# Patient Record
Sex: FEMALE | Race: WHITE | NOT HISPANIC OR LATINO | Employment: FULL TIME | URBAN - NONMETROPOLITAN AREA
[De-identification: names, ages, dates, MRNs, and addresses within clinical notes are randomized per-mention and may not be internally consistent; named-entity substitution may affect disease eponyms.]

---

## 2023-11-04 ENCOUNTER — APPOINTMENT (OUTPATIENT)
Dept: PRIMARY CARE | Facility: CLINIC | Age: 58
End: 2023-11-04

## 2024-03-27 PROBLEM — Z98.890 H/O CERVICAL BIOPSY: Chronic | Status: ACTIVE | Noted: 2024-03-27

## 2024-03-27 PROBLEM — I10 HIGH BLOOD PRESSURE: Status: ACTIVE | Noted: 2024-03-27

## 2024-03-27 PROBLEM — C43.72 MALIGNANT MELANOMA OF LEFT LOWER LEG (MULTI): Status: ACTIVE | Noted: 2024-03-27

## 2024-03-28 NOTE — PROGRESS NOTES
Subjective      HPI:          Bethany Johnston is a 59 y.o. female 59 y.o. is here today for PE/health maintenance      Chief Complaint   Patient presents with    Establish Care     No concerns - wants to establish with local PCP since moving from [place]    Immunizations     Received flu vaccine earlier       Pt is NEW PATIENT to the office today     Name of specialists that pt is currently seeing:?            Last colonoscopy-?  2019    Last Tdap-?  unknown    Last DXA-?  Hasn't had one          Has had Shingrix         History HTN- was on enalapril and HCTZ  2023         Pt has 3 kids and 1 1 1/2 yr old grand child       Pt has history Malignant melanoma       History Cervical bx-  by When- CNP- last year - saw 2 weeks ago- Mammo planned May 2nd       [facility]PTFS recommend  laboratory  screening for HIV in patients ages 15-65-   Discussed       Health Maintenance Topics        Topic Date     Yearly Adult Physical today    HIV Screening Never done    Colorectal Cancer Screening 2019     Hepatitis C Screening 4/2023 non-reactive     DTaP/Tdap/Td Vaccines Discussed      Health Maintenance Topics        Topic Date     Mammogram 4/2023     Health Maintenance Topics with due status: Not Due       Topic Last Completion Date    Cervical Cancer Screening- When, CNP  01/18/2022- history cervical bx      Health Maintenance Topics with due status: Completed       Topic Last Completion Date    Zoster Vaccines 01/17/2022; 10/30/2021     Health Maintenance Topics with due status: Aged Out       Topic Date Due    HIB Vaccines Aged Out    IPV Vaccines Aged Out    Hepatitis A Vaccines Aged Out    Meningococcal Vaccine Aged Out    Rotavirus Vaccines Aged Out    HPV Vaccines Aged Out    Pneumococcal Vaccine: Pediatrics (0 to 5 Years) and At-Risk Patients (6 to 64 Years) Aged Out                 Immunization History   Administered Date(s) Administered    Flu vaccine, quadrivalent, no egg protein, age 6 month or greater (FLUCELVAX) 10/14/2019,  10/24/2020, 10/30/2021    Moderna COVID-19 vaccine, Fall 2023, 12 yeasrs and older (50mcg/0.5mL) 10/10/2023    Moderna SARS-CoV-2 Vaccination 04/27/2021, 05/25/2021, 01/23/2022    Zoster vaccine, recombinant, adult (SHINGRIX) 10/30/2021, 01/17/2022         Social History     Tobacco Use   Smoking Status Never   Smokeless Tobacco Never         If candidate, consider LDLCT-         reports that she does not currently use alcohol.       No visits with results within 12 Month(s) from this visit.   Latest known visit with results is:   Legacy Encounter on 01/17/2022   Component Date Value Ref Range Status    Glucose 01/17/2022 83  74 - 99 mg/dL Final    Sodium 01/17/2022 143  136 - 145 mmol/L Final    Potassium 01/17/2022 4.1  3.5 - 5.3 mmol/L Final    Chloride 01/17/2022 103  98 - 107 mmol/L Final    Bicarbonate 01/17/2022 32  21 - 32 mmol/L Final    Anion Gap 01/17/2022 12  10 - 20 mmol/L Final    Urea Nitrogen 01/17/2022 23  6 - 23 mg/dL Final    Creatinine 01/17/2022 0.62  0.50 - 1.05 mg/dL Final    GFR Female 01/17/2022 >90  >90 mL/min/1.73m2 Final    Comment:  CALCULATIONS OF ESTIMATED GFR ARE PERFORMED   USING THE 2021 CKD-EPI STUDY REFIT EQUATION   WITHOUT THE RACE VARIABLE FOR THE IDMS-TRACEABLE   CREATININE METHODS.    https://jasn.asnjournals.org/content/early/2021/09/22/ASN.6811875832      Calcium 01/17/2022 10.1  8.6 - 10.6 mg/dL Final             Current Outpatient Medications:     metFORMIN (Glucophage) 500 mg tablet, Take 1 tablet (500 mg) by mouth once daily., Disp: , Rfl:     multivitamin tablet, Take 1 tablet by mouth once daily., Disp: , Rfl:     naltrexone (Depade) 50 mg tablet, Take 14 mg by mouth 2 times a day., Disp: , Rfl:     SUMAtriptan (Imitrex) 25 mg tablet, Take 1 tablet (25 mg) by mouth., Disp: , Rfl:       Review of Systems      Review of Systems        Objective        PE:          Visit Vitals  BP (!) 149/93 (BP Location: Left arm, Patient Position: Sitting, BP Cuff Size: Adult)   Pulse  "105   Temp 36.8 °C (98.2 °F) (Temporal)   Ht 1.575 m (5' 2\")   Wt 79.2 kg (174 lb 8 oz)   SpO2 96%   BMI 31.92 kg/m²   OB Status Postmenopausal   Smoking Status Never   BSA 1.86 m²                    Pt is A and O x3, NAD  Head- normocephalic and atraumatic,   EYES- conjunctiva- normal   lids- normal  EARS/NOSE- TM's normal, nasopharynx- normal and atraumatic  OROPHARYNX- normal  NECK- supple, FROM  THYROID- NT, normal size, no nodule noted  LYMPH- no cervical lymph nodes palpated   CV- RRR without murmur  PULM- CTA bilaterally, normal respiratory effort  RESPIRATORY EFFORT- normal , no retractions or nasal flaring   ABD- normoactive BS's , soft , NT, no hepatosplenomegaly palpated  EXT- no edema,NT  SKIN- no abnormal skin lesions noted  NEURO- no focal deficits  PSYCH- pleasant, normal judgement and insight    BP Readings from Last 3 Encounters:   03/29/24 (!) 149/93   08/23/22 135/79         Wt Readings from Last 3 Encounters:   03/29/24 79.2 kg (174 lb 8 oz)   08/23/22 84.2 kg (185 lb 10 oz)         BMI Readings from Last 3 Encounters:   03/29/24 31.92 kg/m²   08/23/22 33.81 kg/m²       The number and complexity of problems addressed is considered moderate.  The amount and/or complexity of data reviewed and analyzed is considered moderate. The risk of complications and/or morbidity/mortality of patient is considered moderate. Overall, this patient encounter is considered a moderate risk visit.        Assessment/Plan      Problem List Items Addressed This Visit          Active Problems    Screening for cholesterol level (Chronic)    Screening for diabetes mellitus (Chronic)    Screening for HIV (human immunodeficiency virus) (Chronic)    Well adult exam - Primary (Chronic)       No orders of the defined types were placed in this encounter.      Ordered lab          Follow up 4-6 weeks - recheck BP - if remains >140/90 restart the enaparil            Recommendations for women annual wellness exam:   Make sure " screenings for cervical and breast cancer are up to date if applicable- pap smears age 21-65  mammogram starting at age 35- 40 or sooner if positive family history of breast cancer   colonoscopy at age 45, earlier if positive family history for breast or colon cancer   Screen for osteoporosis with DXA bone scan starting at age 65 or sooner if risk factors present (long term steroid use, smoking, heavy alcohol use, history of fracture, rheumatoid arthritis, low body weight, family history of hip fracture)  Screening for lung cancer with low-dose CT in all adults age 50-80 who have a 20 pack-year smoking history and currently smoke or have quit within the past 15 years; and are symptom free/no prior pulmonary diagnosis  Gardisil vaccine age 9-26 years of age   Follow a healthy diet (Examples, Dash diet, Mediterranean diet)  Exercise 150 minutes per week   Maintain healthy weight (BMI < 25)  Do not smoke   Alcohol in moderation (up to 1 drink/day)  Get enough sleep (7-8 hours/night)  Take a prenatal vitamin with folic acid if possibility of pregnancy   Make sure immunizations are up to date   Recommend minimum 1,000 mg calcium and 600-800 IU vitamin D daily (combination of diet + supplement)- unless there is a contraindication   Visit dentist twice yearly      If you are less than 60 years old, have diabetes mellitus, or chronic kidney disease, your goal blood pressure is < 140/90.  If you are older than 60 years old and do not have diabetes or kidney disease, your goal blood pressure is < 150/90.

## 2024-03-29 ENCOUNTER — OFFICE VISIT (OUTPATIENT)
Dept: PRIMARY CARE | Facility: CLINIC | Age: 59
End: 2024-03-29
Payer: COMMERCIAL

## 2024-03-29 ENCOUNTER — LAB (OUTPATIENT)
Dept: LAB | Facility: LAB | Age: 59
End: 2024-03-29
Payer: COMMERCIAL

## 2024-03-29 ENCOUNTER — TELEPHONE (OUTPATIENT)
Dept: PRIMARY CARE | Facility: CLINIC | Age: 59
End: 2024-03-29

## 2024-03-29 VITALS
WEIGHT: 174.5 LBS | HEART RATE: 105 BPM | TEMPERATURE: 98.2 F | HEIGHT: 62 IN | SYSTOLIC BLOOD PRESSURE: 149 MMHG | BODY MASS INDEX: 32.11 KG/M2 | OXYGEN SATURATION: 96 % | DIASTOLIC BLOOD PRESSURE: 93 MMHG

## 2024-03-29 DIAGNOSIS — I10 PRIMARY HYPERTENSION: Chronic | ICD-10-CM

## 2024-03-29 DIAGNOSIS — Z13.220 SCREENING FOR CHOLESTEROL LEVEL: Chronic | ICD-10-CM

## 2024-03-29 DIAGNOSIS — G43.809 OTHER MIGRAINE WITHOUT STATUS MIGRAINOSUS, NOT INTRACTABLE: Chronic | ICD-10-CM

## 2024-03-29 DIAGNOSIS — Z13.1 SCREENING FOR DIABETES MELLITUS: Chronic | ICD-10-CM

## 2024-03-29 DIAGNOSIS — Z91.030 BEE STING ALLERGY: Primary | ICD-10-CM

## 2024-03-29 DIAGNOSIS — Z00.00 WELL ADULT EXAM: Primary | Chronic | ICD-10-CM

## 2024-03-29 DIAGNOSIS — Z11.4 SCREENING FOR HIV (HUMAN IMMUNODEFICIENCY VIRUS): Chronic | ICD-10-CM

## 2024-03-29 DIAGNOSIS — M85.80 OSTEOPENIA, UNSPECIFIED LOCATION: ICD-10-CM

## 2024-03-29 DIAGNOSIS — Z23 IMMUNIZATION DUE: Chronic | ICD-10-CM

## 2024-03-29 DIAGNOSIS — Z78.0 POSTMENOPAUSAL: Chronic | ICD-10-CM

## 2024-03-29 PROBLEM — G43.909 MIGRAINE WITHOUT STATUS MIGRAINOSUS, NOT INTRACTABLE: Chronic | Status: ACTIVE | Noted: 2024-03-29

## 2024-03-29 PROBLEM — N84.1 ENDOCERVICAL POLYP: Status: ACTIVE | Noted: 2023-03-23

## 2024-03-29 PROBLEM — Z12.11 SCREEN FOR COLON CANCER: Chronic | Status: ACTIVE | Noted: 2024-03-29

## 2024-03-29 PROBLEM — Z11.59 ENCOUNTER FOR HEPATITIS C SCREENING TEST FOR LOW RISK PATIENT: Chronic | Status: ACTIVE | Noted: 2024-03-29

## 2024-03-29 LAB
ANION GAP SERPL CALC-SCNC: 11 MMOL/L (ref 10–20)
BUN SERPL-MCNC: 17 MG/DL (ref 6–23)
CALCIUM SERPL-MCNC: 9.8 MG/DL (ref 8.6–10.6)
CHLORIDE SERPL-SCNC: 105 MMOL/L (ref 98–107)
CHOLEST SERPL-MCNC: 171 MG/DL (ref 0–199)
CHOLESTEROL/HDL RATIO: 3
CO2 SERPL-SCNC: 29 MMOL/L (ref 21–32)
CREAT SERPL-MCNC: 0.64 MG/DL (ref 0.5–1.05)
EGFRCR SERPLBLD CKD-EPI 2021: >90 ML/MIN/1.73M*2
GLUCOSE SERPL-MCNC: 95 MG/DL (ref 74–99)
GLUCOSE SERPL-MCNC: 95 MG/DL (ref 74–99)
HDLC SERPL-MCNC: 56.1 MG/DL
HIV 1+2 AB+HIV1 P24 AG SERPL QL IA: NONREACTIVE
LDLC SERPL CALC-MCNC: 99 MG/DL
NON HDL CHOLESTEROL: 115 MG/DL (ref 0–149)
POTASSIUM SERPL-SCNC: 3.9 MMOL/L (ref 3.5–5.3)
SODIUM SERPL-SCNC: 141 MMOL/L (ref 136–145)
TRIGL SERPL-MCNC: 78 MG/DL (ref 0–149)
TSH SERPL-ACNC: 1.59 MIU/L (ref 0.44–3.98)
VLDL: 16 MG/DL (ref 0–40)

## 2024-03-29 PROCEDURE — 82306 VITAMIN D 25 HYDROXY: CPT

## 2024-03-29 PROCEDURE — 90715 TDAP VACCINE 7 YRS/> IM: CPT | Performed by: FAMILY MEDICINE

## 2024-03-29 PROCEDURE — 36415 COLL VENOUS BLD VENIPUNCTURE: CPT

## 2024-03-29 PROCEDURE — 82947 ASSAY GLUCOSE BLOOD QUANT: CPT

## 2024-03-29 PROCEDURE — 3077F SYST BP >= 140 MM HG: CPT | Performed by: FAMILY MEDICINE

## 2024-03-29 PROCEDURE — 80061 LIPID PANEL: CPT

## 2024-03-29 PROCEDURE — 87389 HIV-1 AG W/HIV-1&-2 AB AG IA: CPT

## 2024-03-29 PROCEDURE — 90471 IMMUNIZATION ADMIN: CPT | Performed by: FAMILY MEDICINE

## 2024-03-29 PROCEDURE — 1036F TOBACCO NON-USER: CPT | Performed by: FAMILY MEDICINE

## 2024-03-29 PROCEDURE — 3080F DIAST BP >= 90 MM HG: CPT | Performed by: FAMILY MEDICINE

## 2024-03-29 PROCEDURE — 84443 ASSAY THYROID STIM HORMONE: CPT

## 2024-03-29 PROCEDURE — 80048 BASIC METABOLIC PNL TOTAL CA: CPT

## 2024-03-29 PROCEDURE — 99386 PREV VISIT NEW AGE 40-64: CPT | Performed by: FAMILY MEDICINE

## 2024-03-29 RX ORDER — MULTIVITAMIN
1 TABLET ORAL DAILY
COMMUNITY

## 2024-03-29 RX ORDER — SUMATRIPTAN SUCCINATE 25 MG/1
25 TABLET ORAL ONCE AS NEEDED
Qty: 9 TABLET | Refills: 0 | Status: SHIPPED | OUTPATIENT
Start: 2024-03-29

## 2024-03-29 RX ORDER — NALTREXONE HYDROCHLORIDE 50 MG/1
14 TABLET, FILM COATED ORAL 2 TIMES DAILY
COMMUNITY
Start: 2024-03-18

## 2024-03-29 RX ORDER — METFORMIN HYDROCHLORIDE 500 MG/1
500 TABLET ORAL
COMMUNITY
Start: 2024-03-18

## 2024-03-29 RX ORDER — SUMATRIPTAN SUCCINATE 25 MG/1
25 TABLET ORAL
COMMUNITY
End: 2024-03-29 | Stop reason: SDUPTHER

## 2024-03-29 RX ORDER — EPINEPHRINE 0.3 MG/.3ML
1 INJECTION SUBCUTANEOUS AS NEEDED
Qty: 1 EACH | Refills: 1 | Status: SHIPPED | OUTPATIENT
Start: 2024-03-29 | End: 2025-03-29

## 2024-03-29 NOTE — TELEPHONE ENCOUNTER
Patient wanted to make sure that you sent a Epipen sent to the [facility] pharmacy  in [place].

## 2024-04-04 ENCOUNTER — HOSPITAL ENCOUNTER (OUTPATIENT)
Dept: RADIOLOGY | Facility: CLINIC | Age: 59
Discharge: HOME | End: 2024-04-04
Payer: COMMERCIAL

## 2024-04-04 DIAGNOSIS — M85.80 OSTEOPENIA, UNSPECIFIED LOCATION: ICD-10-CM

## 2024-04-04 DIAGNOSIS — Z78.0 POSTMENOPAUSAL: Chronic | ICD-10-CM

## 2024-04-04 DIAGNOSIS — E55.9 VITAMIN D DEFICIENCY: Primary | ICD-10-CM

## 2024-04-04 LAB — 25(OH)D3 SERPL-MCNC: 28 NG/ML (ref 30–100)

## 2024-04-04 PROCEDURE — 77080 DXA BONE DENSITY AXIAL: CPT

## 2024-04-04 PROCEDURE — 77080 DXA BONE DENSITY AXIAL: CPT | Performed by: STUDENT IN AN ORGANIZED HEALTH CARE EDUCATION/TRAINING PROGRAM

## 2024-04-05 ENCOUNTER — TELEPHONE (OUTPATIENT)
Dept: PRIMARY CARE | Facility: CLINIC | Age: 59
End: 2024-04-05

## 2024-04-05 NOTE — TELEPHONE ENCOUNTER
Pt said she was supposed to call with bp results   04/01/24    140/79  04/02/24    147/94  04/03/24    160/89  04/04/24    142/88  04/05/24    140/87  04/06/24    138/86  Said she use to be on bp medication and if she needs to go back on bp medication?

## 2024-04-06 DIAGNOSIS — I10 PRIMARY HYPERTENSION: Primary | ICD-10-CM

## 2024-04-06 RX ORDER — ENALAPRIL MALEATE 5 MG/1
5 TABLET ORAL DAILY
Qty: 30 TABLET | Refills: 3 | Status: SHIPPED | OUTPATIENT
Start: 2024-04-06 | End: 2024-04-29 | Stop reason: DRUGHIGH

## 2024-04-22 ASSESSMENT — ENCOUNTER SYMPTOMS
SHORTNESS OF BREATH: 0
HEADACHES: 1
NECK PAIN: 1
PND: 0
PALPITATIONS: 1
HYPERTENSION: 1
BLURRED VISION: 0
SWEATS: 0
ORTHOPNEA: 0

## 2024-04-26 NOTE — PROGRESS NOTES
Subjective        Bethany Johnston. Is 59 y.o.. female. Here for follow up office visit-       Chief Complaint   Patient presents with    Hypertension     Does have bp machine with her to show the readings had been high       HPI:        Follow up for - recheck BP, elevated BMI    Pt was NP at last office visit and BP was elevated- 149/93    Pt had been on enalapril 10 mg dailly and HCTZ  25 mg daily in past       Pt is doing well    Labs were done 3/29/24       Doing well with weight- down 15 pounds from 2022       Home BP- on machine 162/74    140/87    160/89       Pt is avoiding caffeine and salt      Pt walking 9000 steps per day, hiking and biking             Lab on 03/29/2024   Component Date Value Ref Range Status    Glucose 03/29/2024 95  74 - 99 mg/dL Final    Cholesterol 03/29/2024 171  0 - 199 mg/dL Final          Age      Desirable   Borderline High   High     0-19 Y     0 - 169       170 - 199     >/= 200    20-24 Y     0 - 189       190 - 224     >/= 225         >24 Y     0 - 199       200 - 239     >/= 240   **All ranges are based on fasting samples. Specific   therapeutic targets will vary based on patient-specific   cardiac risk.    Pediatric guidelines reference:Pediatrics 2011, 128(S5).Adult guidelines reference: NCEP ATPIII Guidelines,JOHN 2001, 258:2486-97    Venipuncture immediately after or during the administration of Metamizole may lead to falsely low results. Testing should be performed immediately prior to Metamizole dosing.    HDL-Cholesterol 03/29/2024 56.1  mg/dL Final      Age       Very Low   Low     Normal    High    0-19 Y    < 35      < 40     40-45     ----  20-24 Y    ----     < 40      >45      ----        >24 Y      ----     < 40     40-60      >60      Cholesterol/HDL Ratio 03/29/2024 3.0   Final      Ref Values  Desirable  < 3.4  High Risk  > 5.0    LDL Calculated 03/29/2024 99  <=99 mg/dL Final                                Near   Borderline      AGE      Desirable  Optimal     High     High     Very High     0-19 Y     0 - 109     ---    110-129   >/= 130     ----    20-24 Y     0 - 119     ---    120-159   >/= 160     ----      >24 Y     0 -  99   100-129  130-159   160-189     >/=190      VLDL 03/29/2024 16  0 - 40 mg/dL Final    Triglycerides 03/29/2024 78  0 - 149 mg/dL Final       Age         Desirable   Borderline High   High     Very High   0 D-90 D    19 - 174         ----         ----        ----  91 D- 9 Y     0 -  74        75 -  99     >/= 100      ----    10-19 Y     0 -  89        90 - 129     >/= 130      ----    20-24 Y     0 - 114       115 - 149     >/= 150      ----         >24 Y     0 - 149       150 - 199    200- 499    >/= 500    Venipuncture immediately after or during the administration of Metamizole may lead to falsely low results. Testing should be performed immediately prior to Metamizole dosing.    Non HDL Cholesterol 03/29/2024 115  0 - 149 mg/dL Final          Age       Desirable   Borderline High   High     Very High     0-19 Y     0 - 119       120 - 144     >/= 145    >/= 160    20-24 Y     0 - 149       150 - 189     >/= 190      ----         >24 Y    30 mg/dL above LDL Cholesterol goal      HIV 1/2 Antigen/Antibody Screen wi* 03/29/2024 Nonreactive  Nonreactive Final    Glucose 03/29/2024 95  74 - 99 mg/dL Final    Sodium 03/29/2024 141  136 - 145 mmol/L Final    Potassium 03/29/2024 3.9  3.5 - 5.3 mmol/L Final    Chloride 03/29/2024 105  98 - 107 mmol/L Final    Bicarbonate 03/29/2024 29  21 - 32 mmol/L Final    Anion Gap 03/29/2024 11  10 - 20 mmol/L Final    Urea Nitrogen 03/29/2024 17  6 - 23 mg/dL Final    Creatinine 03/29/2024 0.64  0.50 - 1.05 mg/dL Final    eGFR 03/29/2024 >90  >60 mL/min/1.73m*2 Final    Calculations of estimated GFR are performed using the 2021 CKD-EPI Study Refit equation without the race variable for the IDMS-Traceable creatinine methods.  https://jasn.asnjournals.org/content/early/2021/09/22/ASN.8327252432    Calcium  03/29/2024 9.8  8.6 - 10.6 mg/dL Final    Thyroid Stimulating Hormone 03/29/2024 1.59  0.44 - 3.98 mIU/L Final    Vitamin D, 25-Hydroxy, Total 03/29/2024 28 (L)  30 - 100 ng/mL Final         REVIEW OF SYSTEMS:        Objective      Vitals:    04/29/24 0841   BP: 150/86   BP Location: Left arm   Patient Position: Sitting   BP Cuff Size: Adult   Pulse: 87   Resp: 14   Temp: 36.7 °C (98.1 °F)   TempSrc: Temporal   SpO2: 96%   Weight: 77.4 kg (170 lb 9.6 oz)                       PHYSICAL:      Patient is alert and oriented x 3 , NAD  HEAD- normocephalic and atraumatic   EYES-conjunctiva-normal, lids - normal  EARS/NOSE- normal external exam   NECK-supple,FROM  CV- RRR without murmur  PULM- CTA bilaterally, normal respiratory effort  RESPIRATORY EFFORT- normal , no retractions or nasal flaring   ABD- normoactive BS's   EXT- no edema,NT  SKIN- no abnormal skin lesions noted  NEURO- no focal deficits  PSYCH- pleasant, normal judgement and insight      BP Readings from Last 3 Encounters:   04/29/24 150/86   03/29/24 (!) 149/93   08/23/22 135/79             Wt Readings from Last 3 Encounters:   04/29/24 77.4 kg (170 lb 9.6 oz)   03/29/24 79.2 kg (174 lb 8 oz)   08/23/22 84.2 kg (185 lb 10 oz)           BMI Readings from Last 3 Encounters:   04/29/24 31.20 kg/m²   03/29/24 31.92 kg/m²   08/23/22 33.81 kg/m²               The number and complexity of problems addressed is considered moderate.  The amount and/or complexity of data reviewed and analyzed is considered moderate. The risk of complications and/or morbidity/mortality of patient is considered moderate. Overall, this patient encounter is considered a moderate risk visit.      Patient's BMI is elevated.  Plan- diet and exercise- BMI is elevated. Need to increase activity on a daily basis especially walking.  Monitor  total calories per day- decrease carbohydrates and fats. Goal - lose 1-2 pounds per week.    Recommend 150 minutes of moderate-intensity exercise as  tolerated per week and 2-3 days of resistance, flexibility, and neuromotor exercises per week.    Normal BMI- 18.5-25    Overweight=  BMI 26-29    Obese= BMI 30-39    Morbidly Obese = BMI >40      Common Side Effects- ACEI    Cough  Dizziness  Feeling tired  Headache  Problems sleeping  Fast heart beat    Call your doctor if you have any of these signs:  Chest pain  Problems breathing or swallowing  Swelling in the face, eyes, lips, tongue, or legs        Assessment/Plan      Problem List Items Addressed This Visit          Active Problems    Class 1 obesity due to excess calories with body mass index (BMI) of 31.0 to 31.9 in adult (Chronic)    High blood pressure - Primary    Well adult exam (Chronic)       No orders of the defined types were placed in this encounter.            Increase Enalapril to 10 mg daily             Follow up in 4-6 weeks - recheck BP

## 2024-04-29 ENCOUNTER — OFFICE VISIT (OUTPATIENT)
Dept: PRIMARY CARE | Facility: CLINIC | Age: 59
End: 2024-04-29
Payer: COMMERCIAL

## 2024-04-29 VITALS
RESPIRATION RATE: 14 BRPM | TEMPERATURE: 98.1 F | DIASTOLIC BLOOD PRESSURE: 86 MMHG | BODY MASS INDEX: 31.2 KG/M2 | HEART RATE: 87 BPM | SYSTOLIC BLOOD PRESSURE: 150 MMHG | WEIGHT: 170.6 LBS | OXYGEN SATURATION: 96 %

## 2024-04-29 DIAGNOSIS — E66.09 CLASS 1 OBESITY DUE TO EXCESS CALORIES WITH BODY MASS INDEX (BMI) OF 31.0 TO 31.9 IN ADULT, UNSPECIFIED WHETHER SERIOUS COMORBIDITY PRESENT: Chronic | ICD-10-CM

## 2024-04-29 DIAGNOSIS — Z00.00 WELL ADULT EXAM: Chronic | ICD-10-CM

## 2024-04-29 DIAGNOSIS — I10 PRIMARY HYPERTENSION: Primary | Chronic | ICD-10-CM

## 2024-04-29 PROBLEM — E66.811 CLASS 1 OBESITY DUE TO EXCESS CALORIES WITH BODY MASS INDEX (BMI) OF 31.0 TO 31.9 IN ADULT: Chronic | Status: ACTIVE | Noted: 2024-04-29

## 2024-04-29 PROCEDURE — 3008F BODY MASS INDEX DOCD: CPT | Performed by: FAMILY MEDICINE

## 2024-04-29 PROCEDURE — 3077F SYST BP >= 140 MM HG: CPT | Performed by: FAMILY MEDICINE

## 2024-04-29 PROCEDURE — 3079F DIAST BP 80-89 MM HG: CPT | Performed by: FAMILY MEDICINE

## 2024-04-29 PROCEDURE — 1036F TOBACCO NON-USER: CPT | Performed by: FAMILY MEDICINE

## 2024-04-29 PROCEDURE — 99214 OFFICE O/P EST MOD 30 MIN: CPT | Performed by: FAMILY MEDICINE

## 2024-04-29 RX ORDER — ENALAPRIL MALEATE 10 MG/1
10 TABLET ORAL DAILY
Qty: 30 TABLET | Refills: 3 | Status: SHIPPED | OUTPATIENT
Start: 2024-04-29 | End: 2025-04-29

## 2024-05-07 ENCOUNTER — TELEPHONE (OUTPATIENT)
Dept: PRIMARY CARE | Facility: CLINIC | Age: 59
End: 2024-05-07
Payer: COMMERCIAL

## 2024-05-08 ENCOUNTER — TELEPHONE (OUTPATIENT)
Dept: PRIMARY CARE | Facility: CLINIC | Age: 59
End: 2024-05-08
Payer: COMMERCIAL

## 2024-05-08 DIAGNOSIS — Z01.818 PRE-OP EVALUATION: Primary | Chronic | ICD-10-CM

## 2024-05-08 NOTE — TELEPHONE ENCOUNTER
Dr. Ch, you can order these.    Lizette cannot schedule without an order.    Please see my email with instructions for ordering.

## 2024-05-08 NOTE — TELEPHONE ENCOUNTER
----- Message from Lizette Ann sent at 5/8/2024  9:05 AM EDT -----  Regarding: PFT order  Pt called to schedule PFT. Order needed to schedule.  Pre-Op Clearance

## 2024-05-08 NOTE — TELEPHONE ENCOUNTER
Patient called back, scheduled apt for June 28th for clearance.   Transferred to [facility] to schedule PFT   Pt Expressed understanding of all information provided

## 2024-05-09 ENCOUNTER — TELEPHONE (OUTPATIENT)
Dept: PRIMARY CARE | Facility: CLINIC | Age: 59
End: 2024-05-09
Payer: COMMERCIAL

## 2024-05-09 DIAGNOSIS — Z01.818 PRE-OP EVALUATION: Primary | Chronic | ICD-10-CM

## 2024-05-09 NOTE — TELEPHONE ENCOUNTER
----- Message from Lizette Ann sent at 5/8/2024  4:10 PM EDT -----  Regarding: Stress Test order  Pt states she also needs an order for a stress test for pre-op clearance.

## 2024-05-09 NOTE — TELEPHONE ENCOUNTER
Called and spoke with patient. Patient was informed, understanding verbalized.  Transferred to [facility] to schedule

## 2024-06-02 ASSESSMENT — ENCOUNTER SYMPTOMS
SWEATS: 0
PALPITATIONS: 1
BLURRED VISION: 0
PND: 0
HEADACHES: 1
ORTHOPNEA: 0
NECK PAIN: 1
SHORTNESS OF BREATH: 0
HYPERTENSION: 1

## 2024-06-03 NOTE — PROGRESS NOTES
Subjective        Bethany Johnston. Is 59 y.o.. female. Here for follow up office visit-       Chief Complaint   Patient presents with    Follow-up    Blood Pressure Check       HPI:        Follow up for - recheck BP    At last office visit-     Home BP- on machine 162/74     140/87     160/89       Today BP is 120/74    And home BP's are 130/70's         Pt is avoiding caffeine and salt      Pt is doing great with her weight loss - down 20 pounds over 2 years     Bikes and walks         Pt walking 9000 steps per day, hiking and biking       Enalapril was increased to 10 mg daily       Pt also has Pre-order placed form [facility] procedure- with IV sedation-   Required PFT's-Scheduled 07/03/2024  Cardiac stress testing  -ordered  Scheduled for 06/25/2024    Needs Preop clearance visit placed /scheduled after these tests are done  and resulted     Surgery date - July 15th- sinuses , implants , bone grafts  Oral surgeon              Lab on 03/29/2024   Component Date Value Ref Range Status    Glucose 03/29/2024 95  74 - 99 mg/dL Final    Cholesterol 03/29/2024 171  0 - 199 mg/dL Final          Age      Desirable   Borderline High   High     0-19 Y     0 - 169       170 - 199     >/= 200    20-24 Y     0 - 189       190 - 224     >/= 225         >24 Y     0 - 199       200 - 239     >/= 240   **All ranges are based on fasting samples. Specific   therapeutic targets will vary based on patient-specific   cardiac risk.    Pediatric guidelines reference:Pediatrics 2011, 128(S5).Adult guidelines reference: NCEP ATPIII Guidelines,JOHN 2001, 258:2486-97    Venipuncture immediately after or during the administration of Metamizole may lead to falsely low results. Testing should be performed immediately prior to Metamizole dosing.    HDL-Cholesterol 03/29/2024 56.1  mg/dL Final      Age       Very Low   Low     Normal    High    0-19 Y    < 35      < 40     40-45     ----  20-24 Y    ----     < 40      >45      ----        >24 Y       ----     < 40     40-60      >60      Cholesterol/HDL Ratio 03/29/2024 3.0   Final      Ref Values  Desirable  < 3.4  High Risk  > 5.0    LDL Calculated 03/29/2024 99  <=99 mg/dL Final                                Near   Borderline      AGE      Desirable  Optimal    High     High     Very High     0-19 Y     0 - 109     ---    110-129   >/= 130     ----    20-24 Y     0 - 119     ---    120-159   >/= 160     ----      >24 Y     0 -  99   100-129  130-159   160-189     >/=190      VLDL 03/29/2024 16  0 - 40 mg/dL Final    Triglycerides 03/29/2024 78  0 - 149 mg/dL Final       Age         Desirable   Borderline High   High     Very High   0 D-90 D    19 - 174         ----         ----        ----  91 D- 9 Y     0 -  74        75 -  99     >/= 100      ----    10-19 Y     0 -  89        90 - 129     >/= 130      ----    20-24 Y     0 - 114       115 - 149     >/= 150      ----         >24 Y     0 - 149       150 - 199    200- 499    >/= 500    Venipuncture immediately after or during the administration of Metamizole may lead to falsely low results. Testing should be performed immediately prior to Metamizole dosing.    Non HDL Cholesterol 03/29/2024 115  0 - 149 mg/dL Final          Age       Desirable   Borderline High   High     Very High     0-19 Y     0 - 119       120 - 144     >/= 145    >/= 160    20-24 Y     0 - 149       150 - 189     >/= 190      ----         >24 Y    30 mg/dL above LDL Cholesterol goal      HIV 1/2 Antigen/Antibody Screen wi* 03/29/2024 Nonreactive  Nonreactive Final    Glucose 03/29/2024 95  74 - 99 mg/dL Final    Sodium 03/29/2024 141  136 - 145 mmol/L Final    Potassium 03/29/2024 3.9  3.5 - 5.3 mmol/L Final    Chloride 03/29/2024 105  98 - 107 mmol/L Final    Bicarbonate 03/29/2024 29  21 - 32 mmol/L Final    Anion Gap 03/29/2024 11  10 - 20 mmol/L Final    Urea Nitrogen 03/29/2024 17  6 - 23 mg/dL Final    Creatinine 03/29/2024 0.64  0.50 - 1.05 mg/dL Final    eGFR 03/29/2024 >90   >60 mL/min/1.73m*2 Final    Calculations of estimated GFR are performed using the 2021 CKD-EPI Study Refit equation without the race variable for the IDMS-Traceable creatinine methods.  https://jasn.asnjournals.org/content/early/2021/09/22/ASN.0501587610    Calcium 03/29/2024 9.8  8.6 - 10.6 mg/dL Final    Thyroid Stimulating Hormone 03/29/2024 1.59  0.44 - 3.98 mIU/L Final    Vitamin D, 25-Hydroxy, Total 03/29/2024 28 (L)  30 - 100 ng/mL Final     Social History     Tobacco Use   Smoking Status Never   Smokeless Tobacco Never         REVIEW OF SYSTEMS:        Objective      Vitals:    06/05/24 0806   BP: 120/74   BP Location: Left arm   Patient Position: Sitting   BP Cuff Size: Large adult   Pulse: 77   Resp: 14   Temp: 37.2 °C (99 °F)   SpO2: 99%   Weight: 74.5 kg (164 lb 3.2 oz)                       PHYSICAL:      Patient is alert and oriented x 3 , NAD  HEAD- normocephalic and atraumatic   EYES-conjunctiva-normal, lids - normal  EARS/NOSE- normal external exam   NECK-supple,FROM  CV- RRR without murmur  PULM- CTA bilaterally, normal respiratory effort  RESPIRATORY EFFORT- normal , no retractions or nasal flaring   ABD- normoactive BS's   EXT- no edema,NT  SKIN- no abnormal skin lesions noted  NEURO- no focal deficits  PSYCH- pleasant, normal judgement and insight      BP Readings from Last 3 Encounters:   06/05/24 120/74   04/29/24 150/86   03/29/24 (!) 149/93             Wt Readings from Last 3 Encounters:   06/05/24 74.5 kg (164 lb 3.2 oz)   04/29/24 77.4 kg (170 lb 9.6 oz)   03/29/24 79.2 kg (174 lb 8 oz)           BMI Readings from Last 3 Encounters:   06/05/24 30.03 kg/m²   04/29/24 31.20 kg/m²   03/29/24 31.92 kg/m²               The number and complexity of problems addressed is considered moderate.  The amount and/or complexity of data reviewed and analyzed is considered moderate. The risk of complications and/or morbidity/mortality of patient is considered moderate. Overall, this patient encounter is  considered a moderate risk visit.    Common Side Effects- ACEI    Cough  Dizziness  Feeling tired  Headache  Problems sleeping  Fast heart beat    Call your doctor if you have any of these signs:  Chest pain  Problems breathing or swallowing  Swelling in the face, eyes, lips, tongue, or legs        Assessment/Plan      Problem List Items Addressed This Visit          Active Problems    Class 1 obesity due to excess calories with body mass index (BMI) of 31.0 to 31.9 in adult (Chronic)    High blood pressure - Primary                   Continue medication            Follow up for Preop clearance after tests are done and resulted

## 2024-06-05 ENCOUNTER — OFFICE VISIT (OUTPATIENT)
Dept: PRIMARY CARE | Facility: CLINIC | Age: 59
End: 2024-06-05
Payer: COMMERCIAL

## 2024-06-05 VITALS
OXYGEN SATURATION: 99 % | HEART RATE: 77 BPM | BODY MASS INDEX: 30.03 KG/M2 | RESPIRATION RATE: 14 BRPM | TEMPERATURE: 99 F | DIASTOLIC BLOOD PRESSURE: 74 MMHG | SYSTOLIC BLOOD PRESSURE: 120 MMHG | WEIGHT: 164.2 LBS

## 2024-06-05 DIAGNOSIS — I10 PRIMARY HYPERTENSION: Primary | Chronic | ICD-10-CM

## 2024-06-05 DIAGNOSIS — E66.09 CLASS 1 OBESITY DUE TO EXCESS CALORIES WITH BODY MASS INDEX (BMI) OF 31.0 TO 31.9 IN ADULT, UNSPECIFIED WHETHER SERIOUS COMORBIDITY PRESENT: Chronic | ICD-10-CM

## 2024-06-05 PROCEDURE — 1036F TOBACCO NON-USER: CPT | Performed by: FAMILY MEDICINE

## 2024-06-05 PROCEDURE — 3078F DIAST BP <80 MM HG: CPT | Performed by: FAMILY MEDICINE

## 2024-06-05 PROCEDURE — 99214 OFFICE O/P EST MOD 30 MIN: CPT | Performed by: FAMILY MEDICINE

## 2024-06-05 PROCEDURE — 3008F BODY MASS INDEX DOCD: CPT | Performed by: FAMILY MEDICINE

## 2024-06-05 PROCEDURE — 3074F SYST BP LT 130 MM HG: CPT | Performed by: FAMILY MEDICINE

## 2024-06-05 RX ORDER — BUPROPION HYDROCHLORIDE 300 MG/1
300 TABLET ORAL DAILY
COMMUNITY

## 2024-06-11 ASSESSMENT — ENCOUNTER SYMPTOMS
HOARSE VOICE: 1
STRIDOR: 0
VOMITING: 0
TROUBLE SWALLOWING: 1
SORE THROAT: 1
DIARRHEA: 0
SWOLLEN GLANDS: 0
COUGH: 1
HEADACHES: 0
SHORTNESS OF BREATH: 0
ABDOMINAL PAIN: 0
NECK PAIN: 0

## 2024-06-11 NOTE — PROGRESS NOTES
Subjective      HPI:    Bethany Johnston. Is 59 y.o.. female. Here for acute visit-           Chief Complaint   Patient presents with    Sore Throat    Nasal Congestion     + chest congestion. Constant x approx 1 week  Pt states that she feels the same way she did when she had bronchitis, i8n the past  Pt denies fever, nausea, vomiting, diarrhea  Sudafed and Flonase mildly alleviate sxs, nothing exacerbates sxs  Pt has not been treated elsewhere for these sxs.               Social History     Tobacco Use   Smoking Status Never   Smokeless Tobacco Never       Social History     Substance and Sexual Activity   Alcohol Use Not Currently          Objective            Vitals:    06/12/24 1103   BP: 116/78   BP Location: Left arm   Patient Position: Sitting   BP Cuff Size: Adult   Pulse: 97   Resp: 12   Temp: (!) 38 °C (100.4 °F)   SpO2: 95%           PHYSICAL:      CONSTITUTIONAL- NAD, Pt is A and O x3, Vital signs are within normal limits, well- hydrated, non-toxic   General Appearance- normal , good hygiene, talks easily  EYES- conjunctiva and lids- normal  EARS/NOSE-TM's normal, head normocephalic and atraumatic, nasopharynx-no nasal discharge, no trismus, no hot potato voice, oropharynx- normal  NECK- supple, FROM  LYMPH- no cervical lymph nodes palpated   CV- RRR without murmur  PULM- mild rhonchi, no wheezes bilaterally       Respiratory effort- normal respiratory effort , no retractions or nasal flaring   ABDOMEN- normoactive BS's , soft , NT, no hepatosplenomegaly palpated  EXTREMITIES- no edema or varicosities           SKIN- no abnormal skin lesions on inspection or palpation   NEURO- no focal deficits  PSYCH- pleasant, normal judgement and insight            BP Readings from Last 3 Encounters:   06/12/24 116/78   06/05/24 120/74   04/29/24 150/86         Wt Readings from Last 3 Encounters:   06/05/24 74.5 kg (164 lb 3.2 oz)   04/29/24 77.4 kg (170 lb 9.6 oz)   03/29/24 79.2 kg (174 lb 8 oz)       BMI Readings  from Last 3 Encounters:   06/05/24 30.03 kg/m²   04/29/24 31.20 kg/m²   03/29/24 31.92 kg/m²           The number and complexity of problems addressed is considered moderate.  The amount and/or complexity of data reviewed and analyzed is considered moderate. The risk of complications and/or morbidity/mortality of patient is considered moderate. Overall, this patient encounter is considered a moderate risk visit.      What are antibiotics?  Antibiotics are medicines that help people fight infections caused by bacteria. They work by killing bacteria that are in the body. These medicines come in many different forms, including pills, ointments, and liquids that are given by injection.    Antibiotics can do a lot of good. For people with serious bacterial infections, antibiotics can save lives. But people use them far too often, even when they're not needed. This is causing a very serious problem called antibiotic resistance. Antibiotic resistance happens when bacteria that have been exposed to an antibiotic change so that the antibiotic can no longer kill them. In those bacteria, the antibiotic has no effect. Because of this problem, doctors are having a harder and harder time treating infections. Experts worry that there will soon be infections that don't respond to any antibiotics.    When are antibiotics helpful?  Antibiotics can help people fight off infections caused by bacteria. They do not work on infections caused by viruses.    Some common bacterial infections that are treated with antibiotics include:    Strep throat    Pneumonia (an infection of the lungs)    Bladder infections    Infections you catch through sex, such as gonorrhea and chlamydia    When are antibiotics NOT helpful?    Antibiotics do not work on infections caused by viruses.    Antibiotics are not helpful for the common cold, because the common cold is caused by a virus.    Antibiotics are not helpful for the flu, because the flu is caused by  a virus. (People with the flu can be treated with medicines called antiviral medicines, which are different from antibiotics.)      Even though antibiotics don't work on infections caused by viruses, people sometimes believe that they do. That's because they took antibiotics for a viral infection before and then got better. The problem is that those people would have gotten better with or without an antibiotic. When they get better with the antibiotic, they think that's what cured them, when in reality the antibiotic had nothing to do with it.    What's the harm in taking antibiotics even if they might not help?  There are many reasons you should not take antibiotics unless you absolutely need them:    Antibiotics cause side effects, such as nausea, vomiting, and diarrhea. They can even make it more likely that you will get a different kind of infection, such as yeast infection (if you are a woman).    Allergies to antibiotics are common. You can develop an allergy to an antibiotic, even if you have not had a problem with it before. Some allergies are just unpleasant, causing rashes and itching. But some can be very serious and even life-threatening. It is better to avoid any risk of an allergy, if the antibiotic wouldn't help you anyway.    Overuse of antibiotics leads to antibiotic resistance. Using antibiotics when they are not needed gives bacteria a chance to change, so that the antibiotics cannot hurt them later on. People who have infections caused by antibiotic-resistant bacteria often have to be treated in the hospital with many different antibiotics. People can even die from these infections, because there is no antibiotic that will cure them.    When should I take antibiotics?  You should take antibiotics only when a doctor or nurse prescribes them to you. You should never take antibiotics prescribed to someone else, and you should not take antibiotics that were prescribed to you for a previous illness.  "When prescribing an antibiotic, doctors and nurses have to carefully pick the right antibiotic for a particular infection. Not all antibiotics work on all bacteria.    If you do have an infection caused by a bacteria, your doctor or nurse might want to find out what that bacteria is, and which antibiotics can kill it. They do this by taking a \"culture\" of the bacteria and growing it in the lab. But it's not possible to do a culture on someone who has already started an antibiotic. So if you start an antibiotic without input from a doctor or nurse it will be impossible to know if you have taken the right one.    What can I do to reduce antibiotic resistance?  Here are some things that can help:    Do not pressure your doctor or nurse for antibiotics when they do not think you need them.    If you are prescribed antibiotics, finish all of the medicine and take it exactly as directed. Never skip doses or stop taking the medicine without talking to your doctor or nurse.    Do not give antibiotics that were prescribed to you to anyone else.    What if my doctor prescribes an antibiotic that did not work for me before?  If an antibiotic did not work for you before, that does not mean it will never work for you. If you have used an antibiotic before and it did not work, tell your doctor. But keep in mind that the infection you had before might not have been caused by the same bacteria that you have now. The \"best\" antibiotic is the right one for the bacteria causing the infection, not for the person with the infection.    What if I am allergic to an antibiotic?  If you had a bad reaction to an antibiotic, tell your doctor or nurse about it. But do not assume you are allergic unless your doctor or nurse tells you that you are.    Many people who think they are allergic to an antibiotic are wrong. If you get nauseous after taking an antibiotic, that does not mean you are allergic to it. Nausea is a common side effect of many " antibiotics. If you are a woman and you get a yeast infection after taking an antibiotic, that does not mean you are allergic to it. Yeast infections are a common side effect of antibiotics.    Symptoms of antibiotic allergy can be mild and include a flat rash and itching. They can also be more serious and include:    Hives - Hives are raised, red patches of skin that are usually very itchy (picture 1).    Lip or tongue swelling    Trouble swallowing or breathing    Serious allergy symptoms can start right after you start taking an antibiotic if you are very sensitive. Less serious symptoms, on the other hand, often start a day or more later.    Almost all antibiotics may cause possible side effects of allergic reaction, nausea, vomiting, diarrhea- most worrisome caused by C. diff, and secondary yeast infection.    INB - consider CXR    Assessment/Plan        1. Cough, unspecified type  amoxicillin (Amoxil) 500 mg capsule      2. Pharyngitis, unspecified etiology  amoxicillin (Amoxil) 500 mg capsule      3. Nasal congestion  amoxicillin (Amoxil) 500 mg capsule                Start amoxicillin            Call if no better or if symptoms worsen

## 2024-06-12 ENCOUNTER — OFFICE VISIT (OUTPATIENT)
Dept: PRIMARY CARE | Facility: CLINIC | Age: 59
End: 2024-06-12
Payer: COMMERCIAL

## 2024-06-12 VITALS
HEART RATE: 97 BPM | DIASTOLIC BLOOD PRESSURE: 78 MMHG | OXYGEN SATURATION: 95 % | RESPIRATION RATE: 12 BRPM | TEMPERATURE: 100.4 F | SYSTOLIC BLOOD PRESSURE: 116 MMHG

## 2024-06-12 DIAGNOSIS — R05.9 COUGH, UNSPECIFIED TYPE: Primary | ICD-10-CM

## 2024-06-12 DIAGNOSIS — R09.81 NASAL CONGESTION: ICD-10-CM

## 2024-06-12 DIAGNOSIS — J02.9 PHARYNGITIS, UNSPECIFIED ETIOLOGY: ICD-10-CM

## 2024-06-12 PROCEDURE — 1036F TOBACCO NON-USER: CPT | Performed by: FAMILY MEDICINE

## 2024-06-12 PROCEDURE — 3078F DIAST BP <80 MM HG: CPT | Performed by: FAMILY MEDICINE

## 2024-06-12 PROCEDURE — 99214 OFFICE O/P EST MOD 30 MIN: CPT | Performed by: FAMILY MEDICINE

## 2024-06-12 PROCEDURE — 3074F SYST BP LT 130 MM HG: CPT | Performed by: FAMILY MEDICINE

## 2024-06-12 PROCEDURE — 3008F BODY MASS INDEX DOCD: CPT | Performed by: FAMILY MEDICINE

## 2024-06-12 RX ORDER — AMOXICILLIN 500 MG/1
CAPSULE ORAL
Qty: 40 CAPSULE | Refills: 0 | Status: SHIPPED | OUTPATIENT
Start: 2024-06-12

## 2024-06-25 ENCOUNTER — HOSPITAL ENCOUNTER (OUTPATIENT)
Dept: RADIOLOGY | Facility: CLINIC | Age: 59
End: 2024-06-25
Payer: COMMERCIAL

## 2024-06-25 ENCOUNTER — HOSPITAL ENCOUNTER (OUTPATIENT)
Dept: RADIOLOGY | Facility: CLINIC | Age: 59
Discharge: HOME | End: 2024-06-25
Payer: COMMERCIAL

## 2024-06-25 ENCOUNTER — APPOINTMENT (OUTPATIENT)
Dept: CARDIOLOGY | Facility: CLINIC | Age: 59
End: 2024-06-25
Payer: COMMERCIAL

## 2024-06-25 ENCOUNTER — APPOINTMENT (OUTPATIENT)
Dept: RADIOLOGY | Facility: CLINIC | Age: 59
End: 2024-06-25
Payer: COMMERCIAL

## 2024-06-25 ENCOUNTER — HOSPITAL ENCOUNTER (OUTPATIENT)
Dept: CARDIOLOGY | Facility: CLINIC | Age: 59
Discharge: HOME | End: 2024-06-25
Payer: COMMERCIAL

## 2024-06-25 ENCOUNTER — TELEPHONE (OUTPATIENT)
Dept: PRIMARY CARE | Facility: CLINIC | Age: 59
End: 2024-06-25
Payer: COMMERCIAL

## 2024-06-25 DIAGNOSIS — Z01.818 PRE-OP EVALUATION: Chronic | ICD-10-CM

## 2024-06-25 PROCEDURE — 93016 CV STRESS TEST SUPVJ ONLY: CPT | Performed by: STUDENT IN AN ORGANIZED HEALTH CARE EDUCATION/TRAINING PROGRAM

## 2024-06-25 PROCEDURE — 93017 CV STRESS TEST TRACING ONLY: CPT

## 2024-06-25 PROCEDURE — 93018 CV STRESS TEST I&R ONLY: CPT | Performed by: STUDENT IN AN ORGANIZED HEALTH CARE EDUCATION/TRAINING PROGRAM

## 2024-06-25 PROCEDURE — 3430000001 HC RX 343 DIAGNOSTIC RADIOPHARMACEUTICALS: Performed by: FAMILY MEDICINE

## 2024-06-25 PROCEDURE — A9502 TC99M TETROFOSMIN: HCPCS | Performed by: FAMILY MEDICINE

## 2024-06-25 PROCEDURE — 78452 HT MUSCLE IMAGE SPECT MULT: CPT

## 2024-06-25 PROCEDURE — 78452 HT MUSCLE IMAGE SPECT MULT: CPT | Performed by: RADIOLOGY

## 2024-06-25 NOTE — TELEPHONE ENCOUNTER
Pt may have had stress test today, however there is question (at this time) as to whether or not procedure was carried out. Holding task to call patient tomorrow (06/26/2024) to follow up.

## 2024-06-28 ENCOUNTER — APPOINTMENT (OUTPATIENT)
Dept: PRIMARY CARE | Facility: CLINIC | Age: 59
End: 2024-06-28
Payer: COMMERCIAL

## 2024-07-03 ENCOUNTER — HOSPITAL ENCOUNTER (OUTPATIENT)
Dept: RESPIRATORY THERAPY | Facility: HOSPITAL | Age: 59
Discharge: HOME | End: 2024-07-03
Payer: COMMERCIAL

## 2024-07-03 DIAGNOSIS — Z01.818 PRE-OP EVALUATION: Chronic | ICD-10-CM

## 2024-07-03 LAB
MGC ASCENT PFT - FEV1 - POST: 2.08
MGC ASCENT PFT - FEV1 - PRE: 2.09
MGC ASCENT PFT - FEV1 - PREDICTED: 2.31
MGC ASCENT PFT - FVC - POST: 2.91
MGC ASCENT PFT - FVC - PRE: 2.93
MGC ASCENT PFT - FVC - PREDICTED: 2.88

## 2024-07-03 PROCEDURE — 94729 DIFFUSING CAPACITY: CPT

## 2024-07-08 PROBLEM — Z01.818 PREOP EXAMINATION: Chronic | Status: ACTIVE | Noted: 2024-07-08

## 2024-07-08 NOTE — PROGRESS NOTES
Subjective          HPI:          Bethany Johnston is a 59 y.o. female  is here today for a pre-op clearance     Date of surgery: 07/15/2024  Who is the doctor performing the surgery: Dentist/Physician at [facility]  What is the patient having done: dental surgery- extraction of remaining upper teeth and implant placement   Does the patient have a form that needs to be filled out: yes  When and where are PAT tests being done? Patient has had cardiac stress testing and PFT's done per recommendation dental surgeon     BP has improved      Cardiac stress test - done 6/25/24- normal     PFT- done 7/3/24 - normal     Per pt may take 6 hours       Pt has stopped Naltrexone on the 5th of July           Hospital Outpatient Visit on 07/03/2024   Component Date Value Ref Range Status    FVC - Predicted 07/03/2024 2.88   Final    FEV1 - Predicted 07/03/2024 2.31   Final    FVC - PRE 07/03/2024 2.93   Final    FEV1 - Pre 07/03/2024 2.09   Final    FVC - Post 07/03/2024 2.91   Final    FEV1 - Post 07/03/2024 2.08   Final   Lab on 03/29/2024   Component Date Value Ref Range Status    Glucose 03/29/2024 95  74 - 99 mg/dL Final    Cholesterol 03/29/2024 171  0 - 199 mg/dL Final          Age      Desirable   Borderline High   High     0-19 Y     0 - 169       170 - 199     >/= 200    20-24 Y     0 - 189       190 - 224     >/= 225         >24 Y     0 - 199       200 - 239     >/= 240   **All ranges are based on fasting samples. Specific   therapeutic targets will vary based on patient-specific   cardiac risk.    Pediatric guidelines reference:Pediatrics 2011, 128(S5).Adult guidelines reference: NCEP ATPIII Guidelines,JOHN 2001, 258:2486-97    Venipuncture immediately after or during the administration of Metamizole may lead to falsely low results. Testing should be performed immediately prior to Metamizole dosing.    HDL-Cholesterol 03/29/2024 56.1  mg/dL Final      Age       Very Low   Low     Normal    High    0-19 Y     < 35      < 40     40-45     ----  20-24 Y    ----     < 40      >45      ----        >24 Y      ----     < 40     40-60      >60      Cholesterol/HDL Ratio 03/29/2024 3.0   Final      Ref Values  Desirable  < 3.4  High Risk  > 5.0    LDL Calculated 03/29/2024 99  <=99 mg/dL Final                                Near   Borderline      AGE      Desirable  Optimal    High     High     Very High     0-19 Y     0 - 109     ---    110-129   >/= 130     ----    20-24 Y     0 - 119     ---    120-159   >/= 160     ----      >24 Y     0 -  99   100-129  130-159   160-189     >/=190      VLDL 03/29/2024 16  0 - 40 mg/dL Final    Triglycerides 03/29/2024 78  0 - 149 mg/dL Final       Age         Desirable   Borderline High   High     Very High   0 D-90 D    19 - 174         ----         ----        ----  91 D- 9 Y     0 -  74        75 -  99     >/= 100      ----    10-19 Y     0 -  89        90 - 129     >/= 130      ----    20-24 Y     0 - 114       115 - 149     >/= 150      ----         >24 Y     0 - 149       150 - 199    200- 499    >/= 500    Venipuncture immediately after or during the administration of Metamizole may lead to falsely low results. Testing should be performed immediately prior to Metamizole dosing.    Non HDL Cholesterol 03/29/2024 115  0 - 149 mg/dL Final          Age       Desirable   Borderline High   High     Very High     0-19 Y     0 - 119       120 - 144     >/= 145    >/= 160    20-24 Y     0 - 149       150 - 189     >/= 190      ----         >24 Y    30 mg/dL above LDL Cholesterol goal      HIV 1/2 Antigen/Antibody Screen wi* 03/29/2024 Nonreactive  Nonreactive Final    Glucose 03/29/2024 95  74 - 99 mg/dL Final    Sodium 03/29/2024 141  136 - 145 mmol/L Final    Potassium 03/29/2024 3.9  3.5 - 5.3 mmol/L Final    Chloride 03/29/2024 105  98 - 107 mmol/L Final    Bicarbonate 03/29/2024 29  21 - 32 mmol/L Final    Anion Gap 03/29/2024 11  10 - 20 mmol/L Final    Urea Nitrogen 03/29/2024 17  6  - 23 mg/dL Final    Creatinine 03/29/2024 0.64  0.50 - 1.05 mg/dL Final    eGFR 03/29/2024 >90  >60 mL/min/1.73m*2 Final    Calculations of estimated GFR are performed using the 2021 CKD-EPI Study Refit equation without the race variable for the IDMS-Traceable creatinine methods.  https://jasn.asnjournals.org/content/early/2021/09/22/ASN.6349134823    Calcium 03/29/2024 9.8  8.6 - 10.6 mg/dL Final    Thyroid Stimulating Hormone 03/29/2024 1.59  0.44 - 3.98 mIU/L Final    Vitamin D, 25-Hydroxy, Total 03/29/2024 28 (L)  30 - 100 ng/mL Final           Past Surgical History:   Procedure Laterality Date    ADENOIDECTOMY  1981    LYMPH NODE BIOPSY  2017    OTHER SURGICAL HISTORY  01/31/2018    Excision Of Lesion Lower Extremity Left    SENTINEL LYMPH NODE BIOPSY  01/31/2018    [place] Lymph Node Biopsy    TONSILLECTOMY  01/04/2018    Tonsillectomy       RCRI risk factors:   High-risk surgery (intrathoracic, intraabdominal or vascular surgery): no  Coronary artery disease or MI: np  Congestive heart failure: no  Stroke:  no  Insulin treatment for diabetes mellitus: no  Preoperative serum creatinine > 2.0 mg/dL: no    A score of 0 or 1 indicates low risk of major cardiac adverse events. If risk by this calculator is elevated (2 or more), risk is determined by functional capacity.     If excellent functional capacity- 10 mets or more, such as strenuous sports participation- clear for surgery.     If good functional capacity- 7-10 mets, such as run a short distance, climb stairs, doubles tennis- clear for surgery.     If moderate functional capacity- 4-6 mets, such as walk on level ground at 4mph, do light housework- clear for surgery.     If poor functional capacity- 1-3 mets, such as eating, dressing, walking a block or two at 2-3 mph, but cardiac symptoms at any higher activity- need cardiac consultation, consideration of stress test or cath before procedure.         History of problems with anesthesia: no  Family history  of problems with anesthesia:  no  History of difficult intubation:  no         Per pt recovery- several month recovery time     Pt has been doing great with her weight loss- total 23 pounds in 2 years           Objective        PHYSICAL:    Vitals:    07/10/24 1100   BP: 125/75   BP Location: Left arm   Patient Position: Sitting   BP Cuff Size: Adult   Pulse: 79   Resp: 14   Temp: 37.3 °C (99.1 °F)   SpO2: 98%   Weight: 73.6 kg (162 lb 4.8 oz)         Pt is A and O x3, NAD  Head- normocephalic and atraumatic,   EYES- conjunctiva- normal   lids- normal  EARS/NOSE- TM's normal, nasopharynx- normal and atraumatic  OROPHARYNX- normal  NECK- supple, FROM  THYROID- NT, normal size, no nodule noted  LYMPH- no cervical lymph nodes palpated   CV- RRR without murmur  PULM- CTA bilaterally, normal respiratory effort  RESPIRATORY EFFORT- normal , no retractions or nasal flaring   ABD- normoactive BS's , soft , NT, no hepatosplenomegaly palpated  EXT- no edema,NT  SKIN- no abnormal skin lesions noted  NEURO- no focal deficits  PSYCH- pleasant, normal judgement and insight      BP Readings from Last 3 Encounters:   07/10/24 125/75   06/12/24 116/78   06/05/24 120/74         Wt Readings from Last 3 Encounters:   07/10/24 73.6 kg (162 lb 4.8 oz)   06/05/24 74.5 kg (164 lb 3.2 oz)   04/29/24 77.4 kg (170 lb 9.6 oz)           BMI Readings from Last 3 Encounters:   07/10/24 29.69 kg/m²   06/05/24 30.03 kg/m²   04/29/24 31.20 kg/m²         The number and complexity of problems addressed is considered moderate.  The amount and/or complexity of data reviewed and analyzed is considered moderate. The risk of complications and/or morbidity/mortality of patient is considered moderate. Overall, this patient encounter is considered a moderate risk visit.    Common Side Effects- ACEI    Cough  Dizziness  Feeling tired  Headache  Problems sleeping  Fast heart beat    Call your doctor if you have any of these signs:  Chest pain  Problems  breathing or swallowing  Swelling in the face, eyes, lips, tongue, or legs        Assessment/Plan          1. Preop examination        2. Primary hypertension        3. Class 1 obesity due to excess calories with body mass index (BMI) of 31.0 to 31.9 in adult, unspecified whether serious comorbidity present            Patient is cleared for her surgery       Preop form - will be complete and faxed

## 2024-07-10 ENCOUNTER — APPOINTMENT (OUTPATIENT)
Dept: PRIMARY CARE | Facility: CLINIC | Age: 59
End: 2024-07-10
Payer: COMMERCIAL

## 2024-07-10 VITALS
OXYGEN SATURATION: 98 % | HEART RATE: 79 BPM | BODY MASS INDEX: 29.69 KG/M2 | SYSTOLIC BLOOD PRESSURE: 125 MMHG | DIASTOLIC BLOOD PRESSURE: 75 MMHG | RESPIRATION RATE: 14 BRPM | TEMPERATURE: 99.1 F | WEIGHT: 162.3 LBS

## 2024-07-10 DIAGNOSIS — I10 PRIMARY HYPERTENSION: Chronic | ICD-10-CM

## 2024-07-10 DIAGNOSIS — Z01.818 PREOP EXAMINATION: Primary | Chronic | ICD-10-CM

## 2024-07-10 DIAGNOSIS — E66.09 CLASS 1 OBESITY DUE TO EXCESS CALORIES WITH BODY MASS INDEX (BMI) OF 31.0 TO 31.9 IN ADULT, UNSPECIFIED WHETHER SERIOUS COMORBIDITY PRESENT: Chronic | ICD-10-CM

## 2024-07-10 PROCEDURE — 3074F SYST BP LT 130 MM HG: CPT | Performed by: FAMILY MEDICINE

## 2024-07-10 PROCEDURE — 3078F DIAST BP <80 MM HG: CPT | Performed by: FAMILY MEDICINE

## 2024-07-10 PROCEDURE — 3008F BODY MASS INDEX DOCD: CPT | Performed by: FAMILY MEDICINE

## 2024-07-10 PROCEDURE — 1036F TOBACCO NON-USER: CPT | Performed by: FAMILY MEDICINE

## 2024-07-10 PROCEDURE — 99214 OFFICE O/P EST MOD 30 MIN: CPT | Performed by: FAMILY MEDICINE

## 2024-07-30 DIAGNOSIS — G43.809 OTHER MIGRAINE WITHOUT STATUS MIGRAINOSUS, NOT INTRACTABLE: Chronic | ICD-10-CM

## 2024-07-31 RX ORDER — SUMATRIPTAN SUCCINATE 25 MG/1
TABLET ORAL
Qty: 9 TABLET | Refills: 0 | Status: SHIPPED | OUTPATIENT
Start: 2024-07-31

## 2024-09-02 DIAGNOSIS — I10 PRIMARY HYPERTENSION: Chronic | ICD-10-CM

## 2024-09-03 RX ORDER — ENALAPRIL MALEATE 10 MG/1
10 TABLET ORAL DAILY
Qty: 30 TABLET | Refills: 0 | Status: SHIPPED | OUTPATIENT
Start: 2024-09-03

## 2024-10-10 DIAGNOSIS — I10 PRIMARY HYPERTENSION: Chronic | ICD-10-CM

## 2024-10-11 DIAGNOSIS — I10 PRIMARY HYPERTENSION: Chronic | ICD-10-CM

## 2024-10-11 RX ORDER — ENALAPRIL MALEATE 10 MG/1
10 TABLET ORAL DAILY
Qty: 30 TABLET | Refills: 3 | Status: SHIPPED | OUTPATIENT
Start: 2024-10-11

## 2024-10-11 RX ORDER — ENALAPRIL MALEATE 10 MG/1
10 TABLET ORAL DAILY
Qty: 30 TABLET | Refills: 3 | Status: SHIPPED | OUTPATIENT
Start: 2024-10-11 | End: 2024-10-11 | Stop reason: SDUPTHER

## 2024-11-19 ENCOUNTER — TELEMEDICINE (OUTPATIENT)
Dept: PRIMARY CARE | Facility: CLINIC | Age: 59
End: 2024-11-19
Payer: COMMERCIAL

## 2024-11-19 ENCOUNTER — LAB (OUTPATIENT)
Dept: LAB | Facility: LAB | Age: 59
End: 2024-11-19
Payer: COMMERCIAL

## 2024-11-19 DIAGNOSIS — R39.15 URGENCY OF URINATION: ICD-10-CM

## 2024-11-19 DIAGNOSIS — R39.15 URGENCY OF URINATION: Primary | ICD-10-CM

## 2024-11-19 LAB
APPEARANCE UR: ABNORMAL
BILIRUB UR STRIP.AUTO-MCNC: NEGATIVE MG/DL
COLOR UR: ABNORMAL
GLUCOSE UR STRIP.AUTO-MCNC: ABNORMAL MG/DL
KETONES UR STRIP.AUTO-MCNC: NEGATIVE MG/DL
LEUKOCYTE ESTERASE UR QL STRIP.AUTO: ABNORMAL
NITRITE UR QL STRIP.AUTO: NEGATIVE
PH UR STRIP.AUTO: 5.5 [PH]
PROT UR STRIP.AUTO-MCNC: ABNORMAL MG/DL
RBC # UR STRIP.AUTO: ABNORMAL /UL
RBC #/AREA URNS AUTO: NORMAL /HPF
SP GR UR STRIP.AUTO: 1.03
UROBILINOGEN UR STRIP.AUTO-MCNC: NORMAL MG/DL
WBC #/AREA URNS AUTO: NORMAL /HPF

## 2024-11-19 PROCEDURE — 81001 URINALYSIS AUTO W/SCOPE: CPT

## 2024-11-19 PROCEDURE — 87086 URINE CULTURE/COLONY COUNT: CPT

## 2024-11-19 PROCEDURE — 99214 OFFICE O/P EST MOD 30 MIN: CPT | Performed by: FAMILY MEDICINE

## 2024-11-19 RX ORDER — SULFAMETHOXAZOLE AND TRIMETHOPRIM 800; 160 MG/1; MG/1
1 TABLET ORAL 2 TIMES DAILY
Qty: 6 TABLET | Refills: 0 | Status: SHIPPED | OUTPATIENT
Start: 2024-11-19 | End: 2024-11-22

## 2024-11-19 RX ORDER — CHLORHEXIDINE GLUCONATE ORAL RINSE 1.2 MG/ML
SOLUTION DENTAL
COMMUNITY
Start: 2024-11-06

## 2024-11-19 RX ORDER — PHENAZOPYRIDINE HYDROCHLORIDE 200 MG/1
200 TABLET, FILM COATED ORAL 3 TIMES DAILY PRN
Qty: 6 TABLET | Refills: 0 | Status: SHIPPED | OUTPATIENT
Start: 2024-11-19 | End: 2024-11-21

## 2024-11-19 NOTE — PATIENT INSTRUCTIONS
"Go to lab to give urine specimen  If the urinalysis (we will get those results today) looks suggestive of infection, start the antibiotic today  Drink plenty of fluids  Pyridium for the pelvic pressure with urination    Please send me a MyChart message if you have any questions or concerns.  FOR NON URGENT questions only.  Allow up to 72 hours for response.    If you have prescription issues or other questions you can email   Doris Blackmon  Digital Health Coordinator, at   [email or web address]     Rest and drink plenty of fluids    Tylenol and or motrin as needed for pain and fever (unless you have been told not to take these because of your personal medical history)    Discussed options and precautions (complaint specific and may include)  Viral versus bacterial infection; use of medications; possible side effects; appropriate over-the-counter medications; possible complications and /or when to follow-up.    if sent for in person care at  or ED,  it was explained why and failure to have \"higher level of care\" further evaluation, and treatment today may lead, but is not limited to negative outcomes, permanent disability, or even death.     All red flags requiring in person care were discussed.    If not sent for in person care today, follow-up with your PCP in 2-3 days, sooner if not  improving.    Follow-up immediately if symptoms worsen. If experiencing symptoms including but not limited to lethargy / chest pain / weakness / dizziness / difficulty breathing please call 911 or go to the emergency department for immediate care.     All patient's questions were answered. Patient has good decision making capacity.  They are alert to person, place, time and situation.  Patient has the ability to communicate choice, understand information, consequences, and reason rationally.      ____________________________________________________________________________________________________________  To connect " with a new PCP please visit [email or web address] or call [phone]                           03-Apr-2021 03:48

## 2024-11-19 NOTE — PROGRESS NOTES
I performed this visit using realtime telehealth tools, including an audio/video OR telephone connection between the patient listed who was located in the [place] and myself, Karen Glasgow (Board certified in the [facility]).  At the start of the visit, I introduced myself as Dr. Acharya and verified the patients name, , and current physical location.    If they were currently outside of the state of [place], the visit was ended and the patient was referred to alternative means for evaluation and treatment.   The patient was made aware of the limitations of the telehealth visit.  They will not be physically examined and all issues may not be appropriate for a telehealth visit.  If necessary, an in person referral will be made.      DISCLAIMER:   In preparing for this visit and writing this note, I reviewed previous electronic medical records (labs, imaging and medical charts) of the patient available in the physician portal. Significant findings which helped in decision making are recorded in this encounter charting.    All allergies were reviewed with the patient and all medications reconciled with the patient.      Chief Complaint: urinary symptoms    HPI: sxs started late last pm-  Typical feeling lower pressure and frequency--   No burning just urgency--  No blood in urine   No odor  No fever chills aches  No N,V,D    > 4 months since last UTI? Yes  Sxs have persisted for 1 days  Dysuria?No  Frequency? Yes  Urgency? Yes  Blood in urine? No    Fever, chills, body aches? No  N,V,Diarrhea? no    Abnormal vaginal bleeding? No  Abnormal vaginal pain? No  Abnormal vaginal discharge No    Flank pain? No  Abdominal pain?  No    H/o kidney stones?No  H/o kidney infection? No    OTC meds? No    All other ROS (-)    Physical Exam:  Alert in NAD  Affect normal  Eyes clear  No resp distress or audible wheezing  CVA TTP  No  Abdominal TTP? No      Urinary frequency and urgency without dysuria  Check urine culture   Yes--will send in bactrim DS bid x 3 day to start if the UA looks suggestive of infection  Rest and drink plenty of fluids  Follow up If no improvement or if symptoms worsen in 48 hours OR if symptoms persist after completing the antibiotics OR if you develop fevers, severe back or abdominal pain or any other concerning symptoms.     At the completion of the visit, possible diagnoses and plan was discussed with the patient as well as recommended treatments, medications prescribed, and when to follow up for in person evaluation. All questions were answered and the patient verbalized understanding.  Limitations to telemedicine include inability to do a complete and accurate physical exam.  Any concerns regarding this were conveyed with the patient and in person follow-up recommended if patient nature of illness does not progress as anticipated during this visit.

## 2024-11-20 LAB — BACTERIA UR CULT: NORMAL

## 2024-12-03 PROBLEM — I10 HIGH BLOOD PRESSURE: Status: RESOLVED | Noted: 2024-03-27 | Resolved: 2024-12-03

## 2024-12-03 PROBLEM — I10 PRIMARY HYPERTENSION: Chronic | Status: ACTIVE | Noted: 2024-12-03

## 2024-12-03 PROBLEM — Z01.818 PREOP EXAMINATION: Chronic | Status: RESOLVED | Noted: 2024-07-08 | Resolved: 2024-12-03

## 2024-12-03 NOTE — PROGRESS NOTES
Subjective        Bethany Johnston. Is 59 y.o.. female. Here for follow up office visit-       Chief Complaint   Patient presents with    Follow-up       HPI:        Follow up for BP, vit d , elevated BMI       Pt is doing well      Doing well with weight -       2020 cologuard - will refer for colonoscopy       Due for lab       Pt states no concerns/questions at this time.      Pt had dental surgery in June and went well       Grandchild 2 12 and one due in April          REVIEW OF SYSTEMS:    Review of Systems         Objective      Vitals:    12/09/24 0752   BP: 129/81   BP Location: Left arm   Patient Position: Sitting   BP Cuff Size: Adult   Pulse: 90   Temp: 36.7 °C (98.1 °F)   TempSrc: Temporal   SpO2: 98%   Weight: 66.1 kg (145 lb 11.2 oz)                       PHYSICAL:      Patient is alert and oriented x 3 , NAD  HEAD- normocephalic and atraumatic   EYES-conjunctiva-normal, lids - normal  EARS/NOSE- normal external exam   NECK-supple,FROM  CV- RRR without murmur  PULM- CTA bilaterally, normal respiratory effort  RESPIRATORY EFFORT- normal , no retractions or nasal flaring   ABD- normoactive BS's   EXT- no edema,NT  SKIN- no abnormal skin lesions noted  NEURO- no focal deficits  PSYCH- pleasant, normal judgement and insight      BP Readings from Last 3 Encounters:   12/09/24 129/81   07/10/24 125/75   06/12/24 116/78             Wt Readings from Last 3 Encounters:   12/09/24 66.1 kg (145 lb 11.2 oz)   07/10/24 73.6 kg (162 lb 4.8 oz)   06/05/24 74.5 kg (164 lb 3.2 oz)           BMI Readings from Last 3 Encounters:   12/09/24 26.65 kg/m²   07/10/24 29.69 kg/m²   06/05/24 30.03 kg/m²               The number and complexity of problems addressed is considered moderate.  The amount and/or complexity of data reviewed and analyzed is considered moderate. The risk of complications and/or morbidity/mortality of patient is considered moderate. Overall, this patient encounter is considered a moderate risk  visit.      Common Side Effects- ACEI    Cough  Dizziness  Feeling tired  Headache  Problems sleeping  Fast heart beat    Call your doctor if you have any of these signs:  Chest pain  Problems breathing or swallowing  Swelling in the face, eyes, lips, tongue, or legs      Assessment/Plan      Assessment & Plan  Primary hypertension  Enalapril   Orders:    Basic Metabolic Panel; Future    Class 1 obesity due to excess calories with body mass index (BMI) of 31.0 to 31.9 in adult, unspecified whether serious comorbidity present  Patient's BMI is elevated.  Plan- diet and exercise- BMI is elevated. Need to increase activity on a daily basis especially walking.  Monitor  total calories per day- decrease carbohydrates and fats. Goal - lose 1-2 pounds per week.    Recommend 150 minutes of moderate-intensity exercise as tolerated per week and 2-3 days of resistance, flexibility, and neuromotor exercises per week.    Normal BMI- 18.5-25    Overweight=  BMI 26-29    Obese= BMI 30-39    Morbidly Obese = BMI >40           Postmenopausal  DXA 4/2024- osteopenia        Vitamin D deficiency  Taking supplement  Orders:    Vitamin D 25-Hydroxy,Total (for eval of Vitamin D levels); Future    Other migraine without status migrainosus, not intractable  sumatriptan  Orders:    SUMAtriptan (Imitrex) 25 mg tablet; Take 1 tablet (25 mg) by mouth 1 time if needed for migraine for up to 36 doses. May repeat dose once in 2 hours if no relief.  Do not exceed 2 doses in 24 hours.    Malignant melanoma of left lower leg (Multi)  History 2017 - treated          Screen for colon cancer    Orders:    Colonoscopy Screening; Average Risk Patient; Future              Continue medication      Ordered lab      Follow up in 6 months- PE and follow up         Time spent during the office visit includes-    updating and familiarizing myself with patients past medical history, social history, and allergies :  discussing ROS ;  obtaining direct  chief  complaint and history of present illness from patient ,  reviewing and reconciling current medication list - both prescription and over the counter medications    clinically examine patient    determine what testing if any is needed to  diagnose the condition/conditions  with which patient is presenting    using medical knowledge to put all of the information together and decide on best course of action to proceed with diagnosis  and  treatment for the presenting problem or problems      Pre-visit planning, chart review, and face-to-face encounter   Discussion of medications  Coordination with office staff for med adjustments   Final documentation of visit        My total time spent caring for the patient for the day of the encounter (before,during, and after) was =     >30     total minutes.    (Prep+during visit+post visit)

## 2024-12-09 ENCOUNTER — APPOINTMENT (OUTPATIENT)
Dept: PRIMARY CARE | Facility: CLINIC | Age: 59
End: 2024-12-09
Payer: COMMERCIAL

## 2024-12-09 VITALS
OXYGEN SATURATION: 98 % | SYSTOLIC BLOOD PRESSURE: 129 MMHG | WEIGHT: 145.7 LBS | BODY MASS INDEX: 26.65 KG/M2 | TEMPERATURE: 98.1 F | HEART RATE: 90 BPM | DIASTOLIC BLOOD PRESSURE: 81 MMHG

## 2024-12-09 DIAGNOSIS — E55.9 VITAMIN D DEFICIENCY: Chronic | ICD-10-CM

## 2024-12-09 DIAGNOSIS — E66.09 CLASS 1 OBESITY DUE TO EXCESS CALORIES WITH BODY MASS INDEX (BMI) OF 31.0 TO 31.9 IN ADULT, UNSPECIFIED WHETHER SERIOUS COMORBIDITY PRESENT: Chronic | ICD-10-CM

## 2024-12-09 DIAGNOSIS — E66.811 CLASS 1 OBESITY DUE TO EXCESS CALORIES WITH BODY MASS INDEX (BMI) OF 31.0 TO 31.9 IN ADULT, UNSPECIFIED WHETHER SERIOUS COMORBIDITY PRESENT: Chronic | ICD-10-CM

## 2024-12-09 DIAGNOSIS — C43.72 MALIGNANT MELANOMA OF LEFT LOWER LEG (MULTI): ICD-10-CM

## 2024-12-09 DIAGNOSIS — Z78.0 POSTMENOPAUSAL: Chronic | ICD-10-CM

## 2024-12-09 DIAGNOSIS — Z12.11 SCREEN FOR COLON CANCER: ICD-10-CM

## 2024-12-09 DIAGNOSIS — I10 PRIMARY HYPERTENSION: Primary | Chronic | ICD-10-CM

## 2024-12-09 DIAGNOSIS — G43.809 OTHER MIGRAINE WITHOUT STATUS MIGRAINOSUS, NOT INTRACTABLE: Chronic | ICD-10-CM

## 2024-12-09 PROCEDURE — 3079F DIAST BP 80-89 MM HG: CPT | Performed by: FAMILY MEDICINE

## 2024-12-09 PROCEDURE — 3074F SYST BP LT 130 MM HG: CPT | Performed by: FAMILY MEDICINE

## 2024-12-09 PROCEDURE — 1036F TOBACCO NON-USER: CPT | Performed by: FAMILY MEDICINE

## 2024-12-09 PROCEDURE — 99214 OFFICE O/P EST MOD 30 MIN: CPT | Performed by: FAMILY MEDICINE

## 2024-12-09 RX ORDER — SUMATRIPTAN SUCCINATE 25 MG/1
25 TABLET ORAL ONCE AS NEEDED
Qty: 9 TABLET | Refills: 3 | Status: SHIPPED | OUTPATIENT
Start: 2024-12-09

## 2024-12-09 NOTE — ASSESSMENT & PLAN NOTE
sumatriptan  Orders:    SUMAtriptan (Imitrex) 25 mg tablet; Take 1 tablet (25 mg) by mouth 1 time if needed for migraine for up to 36 doses. May repeat dose once in 2 hours if no relief.  Do not exceed 2 doses in 24 hours.

## 2025-04-18 ENCOUNTER — OFFICE VISIT (OUTPATIENT)
Dept: PRIMARY CARE | Facility: CLINIC | Age: 60
End: 2025-04-18
Payer: COMMERCIAL

## 2025-04-18 VITALS
SYSTOLIC BLOOD PRESSURE: 135 MMHG | OXYGEN SATURATION: 96 % | WEIGHT: 151.7 LBS | RESPIRATION RATE: 14 BRPM | DIASTOLIC BLOOD PRESSURE: 74 MMHG | HEART RATE: 99 BPM | TEMPERATURE: 99.3 F | BODY MASS INDEX: 27.75 KG/M2

## 2025-04-18 DIAGNOSIS — R30.0 DYSURIA: Primary | ICD-10-CM

## 2025-04-18 DIAGNOSIS — I10 PRIMARY HYPERTENSION: Chronic | ICD-10-CM

## 2025-04-18 DIAGNOSIS — R35.0 URINARY FREQUENCY: ICD-10-CM

## 2025-04-18 DIAGNOSIS — Z91.030 BEE STING ALLERGY: ICD-10-CM

## 2025-04-18 DIAGNOSIS — F41.9 ANXIETY: ICD-10-CM

## 2025-04-18 PROCEDURE — 99214 OFFICE O/P EST MOD 30 MIN: CPT | Performed by: FAMILY MEDICINE

## 2025-04-18 PROCEDURE — 1036F TOBACCO NON-USER: CPT | Performed by: FAMILY MEDICINE

## 2025-04-18 PROCEDURE — 3075F SYST BP GE 130 - 139MM HG: CPT | Performed by: FAMILY MEDICINE

## 2025-04-18 PROCEDURE — 3078F DIAST BP <80 MM HG: CPT | Performed by: FAMILY MEDICINE

## 2025-04-18 RX ORDER — CHLORHEXIDINE GLUCONATE ORAL RINSE 1.2 MG/ML
SOLUTION DENTAL
COMMUNITY
Start: 2025-03-14

## 2025-04-18 RX ORDER — ALPRAZOLAM 0.25 MG/1
TABLET ORAL
Qty: 4 TABLET | Refills: 0 | Status: SHIPPED | OUTPATIENT
Start: 2025-04-18

## 2025-04-18 RX ORDER — ACETAMINOPHEN 500 MG
TABLET ORAL
COMMUNITY

## 2025-04-18 RX ORDER — EPINEPHRINE 0.3 MG/.3ML
1 INJECTION SUBCUTANEOUS AS NEEDED
Qty: 1 EACH | Refills: 1 | Status: SHIPPED | OUTPATIENT
Start: 2025-04-18 | End: 2026-04-18

## 2025-04-18 RX ORDER — NITROFURANTOIN 25; 75 MG/1; MG/1
100 CAPSULE ORAL 2 TIMES DAILY
Qty: 14 CAPSULE | Refills: 0 | Status: SHIPPED | OUTPATIENT
Start: 2025-04-18 | End: 2025-04-25

## 2025-04-18 RX ORDER — ENALAPRIL MALEATE 10 MG/1
10 TABLET ORAL DAILY
Qty: 30 TABLET | Refills: 3 | Status: SHIPPED | OUTPATIENT
Start: 2025-04-18

## 2025-04-18 ASSESSMENT — ENCOUNTER SYMPTOMS
SWEATS: 0
CHILLS: 0
FREQUENCY: 1
HEMATURIA: 0
VOMITING: 0
DYSURIA: 1
NAUSEA: 0
FLANK PAIN: 0

## 2025-04-18 NOTE — PROGRESS NOTES
Subjective        Bethany Johnston is a 60 y.o. female who presents for      HPI:          Chief Complaint   Patient presents with    UTI       What concern/ problem/pain/symptom  brings you here today?  UTI       how long has pt had sxs? Since yesterday       describe symptoms-  Burning and pressure    fever- No  nausea- No  vomiting- No  diarrhea- No  abdominal pain- No  blood in urine-  Urine freq-Yes  Pain with urination- No      how often do symptoms occur?  Often    has pt tried anything for current symptoms, including medications (OTC or prescription)  ?  Peridium         has pt been seen recently for this problem ( within past 2-3 weeks) ? No    if yes- where?    by who?    what treatment was provided?                Tobacco Use History[1]      Social History     Substance and Sexual Activity   Alcohol Use Not Currently          Review of Systems:    Review of Systems    Objective        Vitals:    04/18/25 1254   BP: 135/74   BP Location: Left arm   Patient Position: Sitting   BP Cuff Size: Small adult   Pulse: 99   Resp: 14   Temp: 37.4 °C (99.3 °F)   TempSrc: Temporal   SpO2: 96%   Weight: 68.8 kg (151 lb 11.2 oz)         Pt is A and O x3, NAD, nontoxic, well-hydrated   Head- normocephalic and atraumatic,   EYES- conjunctiva- normal   lids- normal  EARS/NOSE- normal external exam   CV- RRR without murmur  PULM- CTA bilaterally, normal respiratory effort  RESPIRATORY EFFORT- normal , no retractions or nasal flaring   ABD- normoactive BS's , soft, no HSM, no CVAT, NT   EXT- no edema,NT  SKIN- no abnormal skin lesions noted  NEURO- no focal deficits  PSYCH- pleasant, normal judgement and insight          Pt requests Medication for flight anxiety        BP Readings from Last 3 Encounters:   04/18/25 135/74   12/09/24 129/81   07/10/24 125/75           Wt Readings from Last 3 Encounters:   04/18/25 68.8 kg (151 lb 11.2 oz)   12/09/24 66.1 kg (145 lb 11.2 oz)   07/10/24 73.6 kg (162 lb 4.8 oz)         BMI Readings  from Last 3 Encounters:   04/18/25 27.75 kg/m²   12/09/24 26.65 kg/m²   07/10/24 29.69 kg/m²       The number and complexity of problems addressed is considered moderate.  The amount and/or complexity of data reviewed and analyzed is considered moderate. The risk of complications and/or morbidity/mortality of patient is considered moderate. Overall, this patient encounter is considered a moderate risk visit.      What are antibiotics?  Antibiotics are medicines that help people fight infections caused by bacteria. They work by killing bacteria that are in the body. These medicines come in many different forms, including pills, ointments, and liquids that are given by injection.    Antibiotics can do a lot of good. For people with serious bacterial infections, antibiotics can save lives. But people use them far too often, even when they're not needed. This is causing a very serious problem called antibiotic resistance. Antibiotic resistance happens when bacteria that have been exposed to an antibiotic change so that the antibiotic can no longer kill them. In those bacteria, the antibiotic has no effect. Because of this problem, doctors are having a harder and harder time treating infections. Experts worry that there will soon be infections that don't respond to any antibiotics.    When are antibiotics helpful?  Antibiotics can help people fight off infections caused by bacteria. They do not work on infections caused by viruses.    Some common bacterial infections that are treated with antibiotics include:    Strep throat    Pneumonia (an infection of the lungs)    Bladder infections    Infections you catch through sex, such as gonorrhea and chlamydia    When are antibiotics NOT helpful?    Antibiotics do not work on infections caused by viruses.    Antibiotics are not helpful for the common cold, because the common cold is caused by a virus.    Antibiotics are not helpful for the flu, because the flu is caused by a  virus. (People with the flu can be treated with medicines called antiviral medicines, which are different from antibiotics.)      Even though antibiotics don't work on infections caused by viruses, people sometimes believe that they do. That's because they took antibiotics for a viral infection before and then got better. The problem is that those people would have gotten better with or without an antibiotic. When they get better with the antibiotic, they think that's what cured them, when in reality the antibiotic had nothing to do with it.    What's the harm in taking antibiotics even if they might not help?  There are many reasons you should not take antibiotics unless you absolutely need them:    Antibiotics cause side effects, such as nausea, vomiting, and diarrhea. They can even make it more likely that you will get a different kind of infection, such as yeast infection (if you are a woman).    Allergies to antibiotics are common. You can develop an allergy to an antibiotic, even if you have not had a problem with it before. Some allergies are just unpleasant, causing rashes and itching. But some can be very serious and even life-threatening. It is better to avoid any risk of an allergy, if the antibiotic wouldn't help you anyway.    Overuse of antibiotics leads to antibiotic resistance. Using antibiotics when they are not needed gives bacteria a chance to change, so that the antibiotics cannot hurt them later on. People who have infections caused by antibiotic-resistant bacteria often have to be treated in the hospital with many different antibiotics. People can even die from these infections, because there is no antibiotic that will cure them.    When should I take antibiotics?  You should take antibiotics only when a doctor or nurse prescribes them to you. You should never take antibiotics prescribed to someone else, and you should not take antibiotics that were prescribed to you for a previous illness.  "When prescribing an antibiotic, doctors and nurses have to carefully pick the right antibiotic for a particular infection. Not all antibiotics work on all bacteria.    If you do have an infection caused by a bacteria, your doctor or nurse might want to find out what that bacteria is, and which antibiotics can kill it. They do this by taking a \"culture\" of the bacteria and growing it in the lab. But it's not possible to do a culture on someone who has already started an antibiotic. So if you start an antibiotic without input from a doctor or nurse it will be impossible to know if you have taken the right one.    What can I do to reduce antibiotic resistance?  Here are some things that can help:    Do not pressure your doctor or nurse for antibiotics when they do not think you need them.    If you are prescribed antibiotics, finish all of the medicine and take it exactly as directed. Never skip doses or stop taking the medicine without talking to your doctor or nurse.    Do not give antibiotics that were prescribed to you to anyone else.    What if my doctor prescribes an antibiotic that did not work for me before?  If an antibiotic did not work for you before, that does not mean it will never work for you. If you have used an antibiotic before and it did not work, tell your doctor. But keep in mind that the infection you had before might not have been caused by the same bacteria that you have now. The \"best\" antibiotic is the right one for the bacteria causing the infection, not for the person with the infection.    What if I am allergic to an antibiotic?  If you had a bad reaction to an antibiotic, tell your doctor or nurse about it. But do not assume you are allergic unless your doctor or nurse tells you that you are.    Many people who think they are allergic to an antibiotic are wrong. If you get nauseous after taking an antibiotic, that does not mean you are allergic to it. Nausea is a common side effect of many " antibiotics. If you are a woman and you get a yeast infection after taking an antibiotic, that does not mean you are allergic to it. Yeast infections are a common side effect of antibiotics.    Symptoms of antibiotic allergy can be mild and include a flat rash and itching. They can also be more serious and include:    Hives - Hives are raised, red patches of skin that are usually very itchy (picture 1).    Lip or tongue swelling    Trouble swallowing or breathing    Serious allergy symptoms can start right after you start taking an antibiotic if you are very sensitive. Less serious symptoms, on the other hand, often start a day or more later.    Almost all antibiotics may cause possible side effects of allergic reaction, nausea, vomiting, diarrhea- most worrisome caused by C. diff, and secondary yeast infection.      Assessment & Plan  Dysuria  Start macrobid      increase fluids      Bee sting allergy  Epi pen as needed        Primary hypertension  Enalapril        Urinary frequency         Anxiety                              Call if no better or if symptoms worsen             [1]   Social History  Tobacco Use   Smoking Status Never   Smokeless Tobacco Never

## 2025-04-20 LAB
AMORPH SED URNS QL MICRO: ABNORMAL /HPF
APPEARANCE UR: CLEAR
BACTERIA #/AREA URNS HPF: ABNORMAL /HPF
BACTERIA UR CULT: NORMAL
BILIRUB UR QL STRIP: ABNORMAL
CAOX CRY #/AREA URNS HPF: ABNORMAL /HPF
COLOR UR: ABNORMAL
GLUCOSE UR QL STRIP: NEGATIVE
GRAN CASTS #/AREA URNS LPF: ABNORMAL /LPF
HGB UR QL STRIP: NEGATIVE
HYALINE CASTS #/AREA URNS LPF: ABNORMAL /LPF
KETONES UR QL STRIP: NEGATIVE
LEUKOCYTE ESTERASE UR QL STRIP: ABNORMAL
NITRITE UR QL STRIP: POSITIVE
PH UR STRIP: 8.5 [PH] (ref 5–8)
PROT UR QL STRIP: ABNORMAL
RBC #/AREA URNS HPF: ABNORMAL /HPF
SERVICE CMNT-IMP: ABNORMAL
SP GR UR STRIP: 1.03 (ref 1–1.03)
SQUAMOUS #/AREA URNS HPF: ABNORMAL /HPF
WBC #/AREA URNS HPF: ABNORMAL /HPF

## 2025-04-21 ENCOUNTER — TELEPHONE (OUTPATIENT)
Dept: PRIMARY CARE | Facility: CLINIC | Age: 60
End: 2025-04-21
Payer: COMMERCIAL

## 2025-04-21 ENCOUNTER — TELEPHONE (OUTPATIENT)
Dept: PRIMARY CARE | Facility: CLINIC | Age: 60
End: 2025-04-21

## 2025-04-21 NOTE — TELEPHONE ENCOUNTER
Patient  said that she was in this past Friday and that her pcp did four precriptions but drug store received all but one and she also checked this morning the medication is the Alprazolam 0.25mg [facility] in [place].

## 2025-04-22 NOTE — TELEPHONE ENCOUNTER
Patient called in and said that the pharmacy need some one to call the direction was not clear enough on the Alprazolam 0.25 mg.   Please call [facility]   [phone].     Please and thank you.

## 2025-04-22 NOTE — TELEPHONE ENCOUNTER
"Spoke with pharmacy, they needed clarification on directions. Spoke with provider and it is \"one tablet 30-60 minutes prior to flight.\"  "

## 2025-05-28 PROBLEM — Z01.818 PRE-OP EVALUATION: Chronic | Status: RESOLVED | Noted: 2024-05-08 | Resolved: 2025-05-28

## 2025-06-09 NOTE — ASSESSMENT & PLAN NOTE
Taking supplements    Orders:    Vitamin D 25-Hydroxy,Total (for eval of Vitamin D levels); Future

## 2025-06-09 NOTE — PROGRESS NOTES
Subjective      HPI:          Bethany Johnston is a 60 y.o. female 60 y.o. is here today for PE/health maintenance      Chief Complaint   Patient presents with    Annual Exam           Pt also due for f/up chronic medical problems-BP and elevated BMI, vit d             Patient is doing well      Due for labs       Health maintenance:   And General guidelines           TDAP--   Pap - screening for cervical cancer-  21-29 years old: Get a Pap test every three years   30-65 years old: Get a Pap test and an HPV test (co-test) every five years, or a Pap test alone every three years   65 years or older: Do not need screening if you have no history of cervical changes and have had three negative Pap tests in a row, two negative HPV tests in a row, or two negative co-test results in a row within the past 10 years   <25 yrs - GC/chlamydia screen  Mammo- (40-74)  DXA- (>65 yrs, q2yrs) -   Hepatitis C Screen- (18-79, once in a lifetime) - 2023  HIV screen (15-65, once in a lifetime) )-2024  Screen diabetes--  Screen Lipid Panel-- ratio:   Colonoscopy (45-75)--   Shingrix (>50)-  Pneumonia series (>50)-        Other medical specialists are involved in patient's care.    Any recent notes that were available were reviewed.    All conditions are monitored, evaluated and assessed regularly.    Patient is compliant with current treatment regimen/medications.    Specialists involved include:      GYN- Dr Stratton - UTD     Dr Mitchell- 3/17/ 25- colonoscopy       Health Maintenance Topics        Topic Date     Yearly Adult Physical today    Hepatitis C Screening 4/19/2023    Pneumococcal Vaccine Discussed     Cervical Cancer Screening Per GYN    Mammogram Per GYN      Health Maintenance Topics with due status: Not Due       Topic Last Completion Date    DTaP/Tdap/Td Vaccines 03/29/2024    Colorectal Cancer Screening 03/17/2025     Health Maintenance Topics with due status: Completed       Topic Last Completion Date    Zoster Vaccines  "01/17/2022; 10/30/2021    HIV Screening 03/29/2024                   Immunization History   Administered Date(s) Administered    Flu vaccine (IIV4), preservative free *Check age/dose* 11/09/2022    Flu vaccine, quadrivalent, no egg protein, age 6 month or greater (FLUCELVAX) 10/14/2019, 10/24/2020, 10/30/2021, 10/10/2023    Flu vaccine, trivalent, preservative free, no egg protein, age 6 months or greater (Flucelvax) 10/16/2024    Influenza, Unspecified 11/11/2022    Moderna COVID-19 vaccine, 12 years and older (50mcg/0.5mL)(Spikevax) 10/10/2023, 10/16/2024    Moderna SARS-CoV-2 Vaccination 04/27/2021, 05/25/2021, 01/23/2022    Tdap vaccine, age 7 year and older (BOOSTRIX, ADACEL) 03/29/2024    Zoster vaccine, recombinant, adult (SHINGRIX) 10/30/2021, 01/17/2022         Tobacco Use History[1]                  Social History     Substance and Sexual Activity   Alcohol Use Not Currently                  Current Medications[2]      The following portions of the patient's chart were reviewed in this encounter and updated as appropriate:  Tobacco  Allergies  Meds  Problems  Med Hx  Surg Hx       Family History[3]            The 10-year ASCVD risk score (Tom AGUILAR, et al., 2019) is: 3.1%    Values used to calculate the score:      Age: 60 years      Sex: Female      Is Non- : No      Diabetic: No      Tobacco smoker: No      Systolic Blood Pressure: 114 mmHg      Is BP treated: Yes      HDL Cholesterol: 56.1 mg/dL      Total Cholesterol: 171 mg/dL    Lifestyle and medical management to decrease cardiovascular risks.    Review of Systems      Review of Systems        Objective        PE:          Visit Vitals  /74 (BP Location: Left arm, Patient Position: Sitting, BP Cuff Size: Adult)   Pulse 88   Temp 36.6 °C (97.8 °F) (Temporal)   Resp 12   Ht 1.575 m (5' 2\")   Wt 70.4 kg (155 lb 3.2 oz)   SpO2 99%   BMI 28.39 kg/m²   OB Status Postmenopausal   Smoking Status Never   BSA 1.75 m²    "                 Pt is A and O x3, NAD  Head- normocephalic and atraumatic,   EYES- conjunctiva- normal   lids- normal  EARS/NOSE- TM's normal, nasopharynx- normal and atraumatic  OROPHARYNX- normal  NECK- supple, FROM  THYROID- NT, normal size, no nodule noted  LYMPH- no cervical lymph nodes palpated   CV- RRR without murmur  PULM- CTA bilaterally, normal respiratory effort  RESPIRATORY EFFORT- normal , no retractions or nasal flaring   ABD- normoactive BS's , soft , NT, no hepatosplenomegaly palpated  EXT- no edema,NT  SKIN- no abnormal skin lesions noted  NEURO- no focal deficits  PSYCH- pleasant, normal judgement and insight    BP Readings from Last 3 Encounters:   06/11/25 114/74   04/18/25 135/74   12/09/24 129/81         Wt Readings from Last 3 Encounters:   06/11/25 70.4 kg (155 lb 3.2 oz)   04/18/25 68.8 kg (151 lb 11.2 oz)   12/09/24 66.1 kg (145 lb 11.2 oz)         BMI Readings from Last 3 Encounters:   06/11/25 28.39 kg/m²   04/18/25 27.75 kg/m²   12/09/24 26.65 kg/m²       The number and complexity of problems addressed is considered moderate.  The amount and/or complexity of data reviewed and analyzed is considered moderate. The risk of complications and/or morbidity/mortality of patient is considered moderate. Overall, this patient encounter is considered a moderate risk visit.      ACEI:    Do not get pregnant while taking this Medication     Common Side Effects- ACEI    Cough  Dizziness  Feeling tired  Headache  Problems sleeping  Fast heart beat    Call your doctor if you have any of these signs:  Chest pain  Problems breathing or swallowing  Swelling in the face, eyes, lips, tongue, or legs        Assessment/Plan        Assessment & Plan  Well adult exam         Primary hypertension  Enalapril   Orders:    Basic Metabolic Panel; Future    enalapril (Vasotec) 10 mg tablet; Take 1 tablet (10 mg) by mouth once daily.    Class 1 obesity due to excess calories with body mass index (BMI) of 31.0 to 31.9  in adult, unspecified whether serious comorbidity present  Patient's BMI is elevated.  Plan- diet and exercise- BMI is elevated. Need to increase activity on a daily basis especially walking.  Monitor  total calories per day- decrease carbohydrates and fats. Goal - lose 1-2 pounds per week.    Recommend 150 minutes of moderate-intensity exercise as tolerated per week and 2-3 days of resistance, flexibility, and neuromotor exercises per week.    Normal BMI- 18.5-25    Overweight=  BMI 26-29    Obese= BMI 30-39    Morbidly Obese = BMI >40           Vitamin D deficiency  Taking supplements    Orders:    Vitamin D 25-Hydroxy,Total (for eval of Vitamin D levels); Future    Screening for diabetes mellitus         Screening for cholesterol level    Orders:    Lipid Panel; Future    Immunization due    Orders:    Pneumococcal conjugate vaccine, 20-valent (PREVNAR 20)                         Follow up  6 months            Recommendations for women annual wellness exam:   Make sure screenings for cervical and breast cancer are up to date if applicable- pap smears age 21-65  mammogram starting at age 35- 40 or sooner if positive family history of breast cancer   colonoscopy at age 45, earlier if positive family history for breast or colon cancer   Screen for osteoporosis with DXA bone scan starting at age 65 or sooner if risk factors present (long term steroid use, smoking, heavy alcohol use, history of fracture, rheumatoid arthritis, low body weight, family history of hip fracture)  Screening for lung cancer with low-dose CT in all adults age 50-80 who have a 20 pack-year smoking history and currently smoke or have quit within the past 15 years; and are symptom free/no prior pulmonary diagnosis  Gardisil vaccine age 9-26 years of age   Follow a healthy diet (Examples, Dash diet, Mediterranean diet)  Exercise 150 minutes per week   Maintain healthy weight (BMI < 25)  Do not smoke   Alcohol in moderation (up to 1  drink/day)  Get enough sleep (7-8 hours/night)  Take a prenatal vitamin with folic acid if possibility of pregnancy   Make sure immunizations are up to date   Recommend minimum 1,000 mg calcium and 600-800 IU vitamin D daily (combination of diet + supplement)- unless there is a contraindication   Visit dentist twice yearly      If you are less than 60 years old, have diabetes mellitus, or chronic kidney disease, your goal blood pressure is < 140/90.  If you are older than 60 years old and do not have diabetes or kidney disease, your goal blood pressure is < 150/90.            [1]   Social History  Tobacco Use   Smoking Status Never   Smokeless Tobacco Never   [2]   Current Outpatient Medications:     buPROPion XL (Wellbutrin XL) 300 mg 24 hr tablet, Take 1 tablet (300 mg) by mouth once daily. Do not crush, chew, or split., Disp: , Rfl:     chlorhexidine (Peridex) 0.12 % solution, START DAY 3 AFTER PROCEDURE.  SWISH AND SPIT 1/2 CAPFUL TWICE A DAY AFTER BREAKFAST AND BEFORE BEDTIME, Disp: , Rfl:     cholecalciferol (Vitamin D-3) 125 mcg (5,000 units) tablet, Take by mouth once daily., Disp: , Rfl:     enalapril (Vasotec) 10 mg tablet, Take 1 tablet (10 mg) by mouth once daily., Disp: 30 tablet, Rfl: 3    EPINEPHrine (Epipen) 0.3 mg/0.3 mL injection syringe, Inject 0.3 mL (0.3 mg) into the muscle if needed for anaphylaxis. Call 911 after use., Disp: 1 each, Rfl: 1    estradiol (Estrace) 0.01 % (0.1 mg/gram) vaginal cream, Insert 0.25 Applicatorfuls (1 g) into the vagina., Disp: , Rfl:     metFORMIN (Glucophage) 500 mg tablet, Take 1 tablet (500 mg) by mouth once daily., Disp: , Rfl:     multivitamin tablet, Take 1 tablet by mouth once daily., Disp: , Rfl:     naltrexone (Depade) 50 mg tablet, Take 14 mg by mouth 2 times a day., Disp: , Rfl:     SUMAtriptan (Imitrex) 25 mg tablet, Take 1 tablet (25 mg) by mouth 1 time if needed for migraine for up to 36 doses. May repeat dose once in 2 hours if no relief.  Do not  exceed 2 doses in 24 hours., Disp: 9 tablet, Rfl: 3  [3]   Family History  Problem Relation Name Age of Onset    Hypertension Mother Nava Flores     Hypertension Father Gene Mark

## 2025-06-09 NOTE — ASSESSMENT & PLAN NOTE
Enalapril   Orders:    Basic Metabolic Panel; Future    enalapril (Vasotec) 10 mg tablet; Take 1 tablet (10 mg) by mouth once daily.

## 2025-06-10 ASSESSMENT — ENCOUNTER SYMPTOMS
BLURRED VISION: 0
HEADACHES: 0
HYPERTENSION: 1
SHORTNESS OF BREATH: 0
NECK PAIN: 0
SWEATS: 0
PND: 0
PALPITATIONS: 0
ORTHOPNEA: 0

## 2025-06-11 ENCOUNTER — APPOINTMENT (OUTPATIENT)
Dept: PRIMARY CARE | Facility: CLINIC | Age: 60
End: 2025-06-11
Payer: COMMERCIAL

## 2025-06-11 VITALS
DIASTOLIC BLOOD PRESSURE: 74 MMHG | WEIGHT: 155.2 LBS | TEMPERATURE: 97.8 F | HEART RATE: 88 BPM | OXYGEN SATURATION: 99 % | RESPIRATION RATE: 12 BRPM | SYSTOLIC BLOOD PRESSURE: 114 MMHG | BODY MASS INDEX: 28.56 KG/M2 | HEIGHT: 62 IN

## 2025-06-11 DIAGNOSIS — Z00.00 WELL ADULT EXAM: Primary | Chronic | ICD-10-CM

## 2025-06-11 DIAGNOSIS — Z23 IMMUNIZATION DUE: Chronic | ICD-10-CM

## 2025-06-11 DIAGNOSIS — E55.9 VITAMIN D DEFICIENCY: Chronic | ICD-10-CM

## 2025-06-11 DIAGNOSIS — E66.811 CLASS 1 OBESITY DUE TO EXCESS CALORIES WITH BODY MASS INDEX (BMI) OF 31.0 TO 31.9 IN ADULT, UNSPECIFIED WHETHER SERIOUS COMORBIDITY PRESENT: Chronic | ICD-10-CM

## 2025-06-11 DIAGNOSIS — E66.09 CLASS 1 OBESITY DUE TO EXCESS CALORIES WITH BODY MASS INDEX (BMI) OF 31.0 TO 31.9 IN ADULT, UNSPECIFIED WHETHER SERIOUS COMORBIDITY PRESENT: Chronic | ICD-10-CM

## 2025-06-11 DIAGNOSIS — I10 PRIMARY HYPERTENSION: Chronic | ICD-10-CM

## 2025-06-11 DIAGNOSIS — Z13.1 SCREENING FOR DIABETES MELLITUS: Chronic | ICD-10-CM

## 2025-06-11 DIAGNOSIS — Z13.220 SCREENING FOR CHOLESTEROL LEVEL: Chronic | ICD-10-CM

## 2025-06-11 PROCEDURE — 90677 PCV20 VACCINE IM: CPT | Performed by: FAMILY MEDICINE

## 2025-06-11 PROCEDURE — 3074F SYST BP LT 130 MM HG: CPT | Performed by: FAMILY MEDICINE

## 2025-06-11 PROCEDURE — 99396 PREV VISIT EST AGE 40-64: CPT | Performed by: FAMILY MEDICINE

## 2025-06-11 PROCEDURE — 3078F DIAST BP <80 MM HG: CPT | Performed by: FAMILY MEDICINE

## 2025-06-11 PROCEDURE — 90471 IMMUNIZATION ADMIN: CPT | Performed by: FAMILY MEDICINE

## 2025-06-11 PROCEDURE — 1036F TOBACCO NON-USER: CPT | Performed by: FAMILY MEDICINE

## 2025-06-11 PROCEDURE — 3008F BODY MASS INDEX DOCD: CPT | Performed by: FAMILY MEDICINE

## 2025-06-11 RX ORDER — ESTRADIOL 0.1 MG/G
1 CREAM VAGINAL
COMMUNITY
Start: 2025-05-20

## 2025-06-11 RX ORDER — ENALAPRIL MALEATE 10 MG/1
10 TABLET ORAL DAILY
Qty: 30 TABLET | Refills: 11 | Status: SHIPPED | OUTPATIENT
Start: 2025-06-11

## 2025-06-11 ASSESSMENT — PATIENT HEALTH QUESTIONNAIRE - PHQ9
2. FEELING DOWN, DEPRESSED OR HOPELESS: NOT AT ALL
1. LITTLE INTEREST OR PLEASURE IN DOING THINGS: NOT AT ALL
SUM OF ALL RESPONSES TO PHQ9 QUESTIONS 1 AND 2: 0

## 2025-06-13 LAB
25(OH)D3+25(OH)D2 SERPL-MCNC: 41 NG/ML (ref 30–100)
ANION GAP SERPL CALCULATED.4IONS-SCNC: 11 MMOL/L (CALC) (ref 7–17)
BUN SERPL-MCNC: 18 MG/DL (ref 7–25)
BUN/CREAT SERPL: NORMAL (CALC) (ref 6–22)
CALCIUM SERPL-MCNC: 9.5 MG/DL (ref 8.6–10.4)
CHLORIDE SERPL-SCNC: 104 MMOL/L (ref 98–110)
CHOLEST SERPL-MCNC: 166 MG/DL
CHOLEST/HDLC SERPL: 2.4 (CALC)
CO2 SERPL-SCNC: 26 MMOL/L (ref 20–32)
CREAT SERPL-MCNC: 0.69 MG/DL (ref 0.5–1.05)
EGFRCR SERPLBLD CKD-EPI 2021: 99 ML/MIN/1.73M2
GLUCOSE SERPL-MCNC: 83 MG/DL (ref 65–99)
HDLC SERPL-MCNC: 68 MG/DL
LDLC SERPL CALC-MCNC: 84 MG/DL (CALC)
NONHDLC SERPL-MCNC: 98 MG/DL (CALC)
POTASSIUM SERPL-SCNC: 4.1 MMOL/L (ref 3.5–5.3)
SODIUM SERPL-SCNC: 141 MMOL/L (ref 135–146)
TRIGL SERPL-MCNC: 49 MG/DL

## 2025-07-18 DIAGNOSIS — G43.809 OTHER MIGRAINE WITHOUT STATUS MIGRAINOSUS, NOT INTRACTABLE: Chronic | ICD-10-CM

## 2025-07-18 RX ORDER — SUMATRIPTAN SUCCINATE 25 MG/1
TABLET ORAL
Qty: 9 TABLET | Refills: 1 | Status: SHIPPED | OUTPATIENT
Start: 2025-07-18

## 2025-08-07 ENCOUNTER — TELEPHONE (OUTPATIENT)
Dept: PRIMARY CARE | Facility: CLINIC | Age: 60
End: 2025-08-07
Payer: COMMERCIAL

## 2025-08-07 NOTE — TELEPHONE ENCOUNTER
Patient called in asking if she could get a script for her back from her PCP as her chiropractor can not prescribe it. I Advised patient to call in the morning to try and book an appointment as nothing was open or tomorrow yet. Patient stated she starts a new job Monday so the sooner seen the better for her.       Call patient back - [phone]

## 2025-12-11 ENCOUNTER — APPOINTMENT (OUTPATIENT)
Dept: PRIMARY CARE | Facility: CLINIC | Age: 60
End: 2025-12-11
Payer: COMMERCIAL